# Patient Record
Sex: MALE | Race: BLACK OR AFRICAN AMERICAN | Employment: PART TIME | URBAN - METROPOLITAN AREA
[De-identification: names, ages, dates, MRNs, and addresses within clinical notes are randomized per-mention and may not be internally consistent; named-entity substitution may affect disease eponyms.]

---

## 2018-10-29 ENCOUNTER — HOSPITAL ENCOUNTER (EMERGENCY)
Age: 38
Discharge: HOME | End: 2018-10-29
Attending: EMERGENCY MEDICINE

## 2018-10-29 VITALS
DIASTOLIC BLOOD PRESSURE: 69 MMHG | SYSTOLIC BLOOD PRESSURE: 118 MMHG | HEART RATE: 84 BPM | RESPIRATION RATE: 15 BRPM | BODY MASS INDEX: 30.83 KG/M2 | OXYGEN SATURATION: 99 % | TEMPERATURE: 97.8 F | HEIGHT: 71 IN | WEIGHT: 220.2 LBS

## 2018-10-29 DIAGNOSIS — K08.89 PAIN, DENTAL: Primary | ICD-10-CM

## 2018-10-29 PROCEDURE — 99282 EMERGENCY DEPT VISIT SF MDM: CPT

## 2018-10-29 PROCEDURE — 6370000000 HC RX 637 (ALT 250 FOR IP): Performed by: EMERGENCY MEDICINE

## 2018-10-29 RX ORDER — CLINDAMYCIN HYDROCHLORIDE 300 MG/1
600 CAPSULE ORAL ONCE
Status: COMPLETED | OUTPATIENT
Start: 2018-10-29 | End: 2018-10-29

## 2018-10-29 RX ORDER — CLINDAMYCIN HYDROCHLORIDE 300 MG/1
300 CAPSULE ORAL 4 TIMES DAILY
Qty: 40 CAPSULE | Refills: 0 | Status: SHIPPED | OUTPATIENT
Start: 2018-10-29 | End: 2018-11-08

## 2018-10-29 RX ADMIN — CLINDAMYCIN HYDROCHLORIDE 600 MG: 300 CAPSULE ORAL at 09:37

## 2018-10-29 ASSESSMENT — PAIN DESCRIPTION - LOCATION: LOCATION: MOUTH

## 2018-10-29 ASSESSMENT — PAIN SCALES - GENERAL: PAINLEVEL_OUTOF10: 10

## 2018-10-29 ASSESSMENT — PAIN DESCRIPTION - DESCRIPTORS: DESCRIPTORS: ACHING;THROBBING

## 2018-10-29 ASSESSMENT — PAIN DESCRIPTION - PAIN TYPE: TYPE: ACUTE PAIN

## 2018-10-29 ASSESSMENT — PAIN DESCRIPTION - FREQUENCY: FREQUENCY: CONTINUOUS

## 2018-10-29 ASSESSMENT — PAIN DESCRIPTION - ORIENTATION: ORIENTATION: LEFT;LOWER

## 2018-10-29 NOTE — ED PROVIDER NOTES
and soft tissue swelling on the buccal surface of the gumline and maxillary area. No fluctuance. No elevation of floor of mouth. No lymphadenitis. Oropharynx clear. Airway patent. No stridor. No asymmetry. TM's clear. NECK: Supple  SKIN:Warm and dry. Physical Exam     RADIOLOGY  No orders to display       PROCEDURES  Procedures    ED COURSE/MDM  Patient seen and evaluated. Old records reviewed if pertinent. Labs and imaging reviewed and results discussed with patient. I considered dental cellulitis, dental abscess, irreversible pulpitis    Plan of care discussed with patient or family as appropriate. Patient or family in agreement with plan. If discharged, patient was given scripts for the following medications. Discharge Medication List as of 10/29/2018  9:36 AM      START taking these medications    Details   clindamycin (CLEOCIN) 300 MG capsule Take 1 capsule by mouth 4 times daily for 10 days, Disp-40 capsule, R-0Print             CLINICAL IMPRESSION  1. Pain, dental        Blood pressure 118/69, pulse 84, temperature 97.8 °F (36.6 °C), temperature source Oral, resp. rate 15, height 5' 11\" (1.803 m), weight 99.9 kg (220 lb 3.2 oz), SpO2 99 %. DISPOSITION  Atiya Cardenas was Discharged in stable condition.                    Alex Brown MD  10/29/18 8680       Alex Brown MD  10/29/18 7629

## 2021-04-01 ENCOUNTER — IMMUNIZATION (OUTPATIENT)
Dept: FAMILY MEDICINE CLINIC | Age: 41
End: 2021-04-01
Payer: COMMERCIAL

## 2021-04-01 PROCEDURE — 91300 COVID-19, PFIZER VACCINE 30MCG/0.3ML DOSE: CPT | Performed by: FAMILY MEDICINE

## 2021-04-01 PROCEDURE — 0001A COVID-19, PFIZER VACCINE 30MCG/0.3ML DOSE: CPT | Performed by: FAMILY MEDICINE

## 2021-04-22 ENCOUNTER — IMMUNIZATION (OUTPATIENT)
Dept: FAMILY MEDICINE CLINIC | Age: 41
End: 2021-04-22
Payer: COMMERCIAL

## 2021-04-22 PROCEDURE — 91300 COVID-19, PFIZER VACCINE 30MCG/0.3ML DOSE: CPT | Performed by: FAMILY MEDICINE

## 2021-04-22 PROCEDURE — 0002A COVID-19, PFIZER VACCINE 30MCG/0.3ML DOSE: CPT | Performed by: FAMILY MEDICINE

## 2022-07-13 ENCOUNTER — HOSPITAL ENCOUNTER (EMERGENCY)
Age: 42
Discharge: HOME | End: 2022-07-13
Attending: EMERGENCY MEDICINE
Payer: COMMERCIAL

## 2022-07-13 VITALS
SYSTOLIC BLOOD PRESSURE: 115 MMHG | RESPIRATION RATE: 14 BRPM | WEIGHT: 190.4 LBS | HEIGHT: 71 IN | OXYGEN SATURATION: 97 % | DIASTOLIC BLOOD PRESSURE: 71 MMHG | BODY MASS INDEX: 26.65 KG/M2 | TEMPERATURE: 97.6 F | HEART RATE: 75 BPM

## 2022-07-13 DIAGNOSIS — H18.891 CORNEAL IRRITATION OF RIGHT EYE: Primary | ICD-10-CM

## 2022-07-13 PROCEDURE — 6370000000 HC RX 637 (ALT 250 FOR IP): Performed by: EMERGENCY MEDICINE

## 2022-07-13 PROCEDURE — 99283 EMERGENCY DEPT VISIT LOW MDM: CPT

## 2022-07-13 RX ORDER — SULFACETAMIDE SODIUM 100 MG/ML
2 SOLUTION/ DROPS OPHTHALMIC 4 TIMES DAILY
Qty: 15 ML | Refills: 0 | Status: SHIPPED | OUTPATIENT
Start: 2022-07-13 | End: 2022-07-23

## 2022-07-13 RX ORDER — SULFACETAMIDE SODIUM 100 MG/ML
2 SOLUTION/ DROPS OPHTHALMIC ONCE
Status: DISCONTINUED | OUTPATIENT
Start: 2022-07-13 | End: 2022-07-13 | Stop reason: HOSPADM

## 2022-07-13 RX ORDER — ERYTHROMYCIN 5 MG/G
OINTMENT OPHTHALMIC ONCE
Status: COMPLETED | OUTPATIENT
Start: 2022-07-13 | End: 2022-07-13

## 2022-07-13 RX ADMIN — ERYTHROMYCIN: 5 OINTMENT OPHTHALMIC at 00:35

## 2022-07-13 RX ADMIN — FLUORESCEIN SODIUM 1 MG: 1 STRIP OPHTHALMIC at 00:20

## 2022-07-13 NOTE — ED PROVIDER NOTES
[facility]   eMERGENCY dEPARTMENT eNCOUnter      Pt Name: Mark Mckenna  MRN: [id number]  Lucíagfrafael 1980  Date of evaluation: 7/13/2022  Provider: Fara Mcdaniel MD  PCP: . None (Inactive)      CHIEF COMPLAINT       Chief Complaint   Patient presents with    Eye Pain     Pt thinks he has something in his right eye. +Pain and edema. HISTORY OFPRESENT ILLNESS   (Location/Symptom, Timing/Onset, Context/Setting, Quality, Duration, Modifying Factors,Severity)  Note limiting factors. Mark Mckenna is a 39 y.o. male cooking with cast iron and thinks he got something in his right eye he is complaining of some pain and swelling. No difficulty seeing out of the left eye    Nursing Notes were all reviewed and agreed with or any disagreements were addressed  in the HPI. REVIEW OF SYSTEMS    (2-9 systems for level 4, 10 or more for level 5)     Review of Systems    Positives and Pertinent negatives as per HPI. Except as noted above in the ROS, all other systems were reviewed andnegative. PASTMEDICAL HISTORY   History reviewed. No pertinent past medical history. SURGICAL HISTORY       Past Surgical History:   Procedure Laterality Date    SHOULDER ARTHROSCOPY  2/2011   [facility]ERY           CURRENT MEDICATIONS       Previous Medications    No medications on file       ALLERGIES     Pcn [penicillins]    FAMILY HISTORY     History reviewed. No pertinent family history. SOCIAL HISTORY       Social History     Socioeconomic History    Marital status: Single     Spouse name: None    Number of children: None    Years of education: None    Highest education level: None   Occupational History    None   Tobacco Use    Smoking status: Current Every Day Smoker     Packs/day: 0.50     Types: Cigarettes    Smokeless tobacco: Never Used   Substance and Sexual Activity    Alcohol use:  Yes    Drug use: No    Sexual activity: None   Other Topics Concern    None   Social History Narrative    None     Social Determinants of Health     Financial Resource Strain:     Difficulty of Paying Living Expenses: Not on file   Food Insecurity:     Worried About Running Out of Food in the Last Year: Not on file    [address] of Food in the Last Year: Not on file   Transportation Needs:     Lack of Transportation (Medical): Not on file    Lack of Transportation (Non-Medical): Not on file   Physical Activity:     Days of Exercise per Week: Not on file    Minutes of Exercise per Session: Not on file   Stress:     Feeling of Stress : Not on file   Social Connections:     Frequency of Communication with Friends and Family: Not on file    Frequency of Social Gatherings with Friends and Family: Not on file    Attends [employer]ism Services: Not on file    Active Member of [address] [place] [address] [employer] or Organizations: Not on file    Attends Club or Organization Meetings: Not on file    Marital Status: Not on file   Intimate Partner Violence:     Fear of Current or Ex-Partner: Not on file    Emotionally Abused: Not on file    Physically Abused: Not on file    Sexually Abused: Not on file   Housing Stability:     Unable to Pay for Housing in the Last Year: Not on file    Number of [place] in the Last Year: Not on file    Unstable Housing in the Last Year: Not on file       SCREENINGS    Cecilio Coma Scale  Eye Opening: Spontaneous  Best Verbal Response: Oriented  Best Motor Response: Obeys commands  Cecilio Coma Scale Score: 15 @FLOW([id number])@      PHYSICAL EXAM    (up to 7 for level 4, 8 or more for level 5)     ED Triage Vitals [07/13/22 0015]   BP Temp Temp Source Heart Rate Resp SpO2 Height Weight   115/71 97.6 °F (36.4 °C) Oral 75 14 97 % 5' 11\" (1.803 m) 190 lb 6.4 oz (86.4 kg)       Physical Exam      General Appearance:  Alert, cooperative, no distress, appears stated age. Head:  Normocephalic, without obviousabnormality, atraumatic.    Eyes:   The conjunctive a and the sclera are both injected extraocular motions are intact   ENT: Mucous membranes moist.   Neck: Supple, symmetrical, trachea midline, no adenopathy. No jugular venous distention. Extremities: No edema, cords or calf tenderness. Full range of motion. Pulses: 2+ and symmetric   Skin: Turgor is normal, no rashes or lesions. Neurologic: Alert and oriented X 3. No focal findings. Motor grossly normal.  Speech clear, no drift, CN III-XII grossly intact,        DIAGNOSTIC RESULTS   LABS:    Labs Reviewed - No data to display    All other labs were within normal range or not returned as of this dictation. EKG: All EKG's are interpreted by the Emergency Department Physician who eithersigns or Co-signs this chart in the absence of a cardiologist.      RADIOLOGY:   Non-plain film images such as CT, Ultrasound and MRI are read by the radiologist. Plain radiographic images are visualized by myself. *    Interpretation per the Radiologist below, if available at the time of this note:    No orders to display         PROCEDURES   Unless otherwise noted below, none     Procedures    *Fluorescein exam was performed that shows no corneal abrasions I then flipped the lid and was unable to identify any obvious foreign body    CRITICAL CARE TIME   N/A      EMERGENCY DEPARTMENT COURSE and DIFFERENTIALDIAGNOSIS/MDM:   Vitals:    Vitals:    07/13/22 0015   BP: 115/71   Pulse: 75   Resp: 14   Temp: 97.6 °F (36.4 °C)   TempSrc: Oral   SpO2: 97%   Weight: 190 lb 6.4 oz (86.4 kg)   Height: 5' 11\" (1.803 m)       Patient was given thefollowing medications:  Medications   fluorescein ophthalmic strip 1 mg (has no administration in time range)   sulfacetamide (BLEPH-10) 10 % ophthalmic solution 2 drop (has no administration in time range)           The patient tolerated their visit well. The patient and / or the familywere informed of the results of any tests, a time was given to answer questions.     FINAL

## 2022-08-08 ENCOUNTER — OFFICE VISIT (OUTPATIENT)
Dept: INTERNAL MEDICINE CLINIC | Age: 42
End: 2022-08-08
Payer: COMMERCIAL

## 2022-08-08 VITALS
BODY MASS INDEX: 26.78 KG/M2 | OXYGEN SATURATION: 96 % | DIASTOLIC BLOOD PRESSURE: 70 MMHG | WEIGHT: 192 LBS | HEART RATE: 87 BPM | SYSTOLIC BLOOD PRESSURE: 110 MMHG

## 2022-08-08 DIAGNOSIS — G43.009 MIGRAINE WITHOUT AURA AND WITHOUT STATUS MIGRAINOSUS, NOT INTRACTABLE: ICD-10-CM

## 2022-08-08 DIAGNOSIS — R63.4 WEIGHT LOSS: ICD-10-CM

## 2022-08-08 DIAGNOSIS — F17.210 SMOKES CIGARETTES: ICD-10-CM

## 2022-08-08 DIAGNOSIS — Z13.9 SCREENING FOR CONDITION: ICD-10-CM

## 2022-08-08 DIAGNOSIS — F12.90 MARIJUANA USE: ICD-10-CM

## 2022-08-08 DIAGNOSIS — H53.9 VISION DISTURBANCE: Primary | ICD-10-CM

## 2022-08-08 DIAGNOSIS — Z83.3 FAMILY HISTORY OF DIABETES MELLITUS (DM): ICD-10-CM

## 2022-08-08 DIAGNOSIS — R13.19 OTHER DYSPHAGIA: ICD-10-CM

## 2022-08-08 LAB
BILIRUBIN, POC: NORMAL
BLOOD URINE, POC: NEGATIVE
CHP ED QC CHECK: NORMAL
CLARITY, POC: CLEAR
COLOR, POC: YELLOW
GLUCOSE BLD-MCNC: 90 MG/DL
GLUCOSE URINE, POC: NEGATIVE
HBA1C MFR BLD: 5.4 %
KETONES, POC: NEGATIVE
LEUKOCYTE EST, POC: NEGATIVE
NITRITE, POC: NEGATIVE
PH, POC: 5.5
PROTEIN, POC: NEGATIVE
SPECIFIC GRAVITY, POC: 1.03
UROBILINOGEN, POC: 0.2

## 2022-08-08 PROCEDURE — 81002 URINALYSIS NONAUTO W/O SCOPE: CPT | Performed by: INTERNAL MEDICINE

## 2022-08-08 PROCEDURE — G8419 CALC BMI OUT NRM PARAM NOF/U: HCPCS | Performed by: INTERNAL MEDICINE

## 2022-08-08 PROCEDURE — 82962 GLUCOSE BLOOD TEST: CPT | Performed by: INTERNAL MEDICINE

## 2022-08-08 PROCEDURE — 83036 HEMOGLOBIN GLYCOSYLATED A1C: CPT | Performed by: INTERNAL MEDICINE

## 2022-08-08 PROCEDURE — 99204 OFFICE O/P NEW MOD 45 MIN: CPT | Performed by: INTERNAL MEDICINE

## 2022-08-08 PROCEDURE — 4004F PT TOBACCO SCREEN RCVD TLK: CPT | Performed by: INTERNAL MEDICINE

## 2022-08-08 PROCEDURE — 99406 BEHAV CHNG SMOKING 3-10 MIN: CPT | Performed by: INTERNAL MEDICINE

## 2022-08-08 PROCEDURE — G8427 DOCREV CUR MEDS BY ELIG CLIN: HCPCS | Performed by: INTERNAL MEDICINE

## 2022-08-08 RX ORDER — FAMOTIDINE 20 MG/1
20 TABLET, FILM COATED ORAL 2 TIMES DAILY
Qty: 60 TABLET | Refills: 3 | Status: SHIPPED | OUTPATIENT
Start: 2022-08-08 | End: 2022-09-19 | Stop reason: SDUPTHER

## 2022-08-08 SDOH — ECONOMIC STABILITY: FOOD INSECURITY: WITHIN THE PAST 12 MONTHS, YOU WORRIED THAT YOUR FOOD WOULD RUN OUT BEFORE YOU GOT MONEY TO BUY MORE.: NEVER TRUE

## 2022-08-08 SDOH — ECONOMIC STABILITY: FOOD INSECURITY: WITHIN THE PAST 12 MONTHS, THE FOOD YOU BOUGHT JUST DIDN'T LAST AND YOU DIDN'T HAVE MONEY TO GET MORE.: NEVER TRUE

## 2022-08-08 ASSESSMENT — SOCIAL DETERMINANTS OF HEALTH (SDOH): HOW HARD IS IT FOR YOU TO PAY FOR THE VERY BASICS LIKE FOOD, HOUSING, MEDICAL CARE, AND HEATING?: NOT HARD AT ALL

## 2022-08-08 ASSESSMENT — PATIENT HEALTH QUESTIONNAIRE - PHQ9
2. FEELING DOWN, DEPRESSED OR HOPELESS: 0
SUM OF ALL RESPONSES TO PHQ QUESTIONS 1-9: 0
1. LITTLE INTEREST OR PLEASURE IN DOING THINGS: 0
SUM OF ALL RESPONSES TO PHQ QUESTIONS 1-9: 0
SUM OF ALL RESPONSES TO PHQ9 QUESTIONS 1 & 2: 0

## 2022-08-08 NOTE — PATIENT INSTRUCTIONS
TAKE MED AS ADVISED    DIET/ EXERCISE. FOLLOW UP WITHIN 6 WEEKS / AS NEEDED    FOLLOW UP FOR FASTING LABS, CAT SCAN, [address]  Area Laboratory Locations - No appointment necessary. @ indicates the location is open Saturdays in addition to Monday through Friday. Call your preferred location for test preparation, business hours and other information you need. [employer] accepts [employer]. [facility]    @ [place] [facility] Svcs. 3 [facility] Rachana Carrear. Simon, [address]   Ph: [phone] [facility] Lab Svcs. [address]   Ph: [phone]  @ [place] [facility] Lab Svcs. [address] [facility]   [facility]   Ph: [phone]    [facility] Svcs. 409 [facility] 70   Ph: [phone] @ [place] [facility] Svcs. [address]  Ph: [phone] @ [facility] Lab Svcs. 3215 [facility]. Simon Edgard Wil[address]   Ph: [phone]     Lucien [facility] Svcs. [place] [address]  Ph: [phone]    [place]   @ [facility] Svcs. [address] [place], [place] [address]   Ph: [phone] [place] [facility]. Office Bldg. 719 [facility], [address] [place] [address]  Ph: [address] [facility] 95, [address]   [place] [address] Lab Svcs. 54 [facility]   Ph: [phone] [facility]. Lab Svcs.   211 [facility], [address]   Ph: [phone]

## 2022-08-08 NOTE — PROGRESS NOTES
SUBJECTIVE:  Eloy Beach is a 39 y.o. male HERE FOR   Chief Complaint   Patient presents with    New Patient      PT HERE TO [address] [place] [address] PCP: NONE      C/O VISUAL CHANGES - ? EL. PAST FEW MONTHS. STATES RECENT EYE EXAM AT [facility]. USING PRESCRIPTION LENSES. NO IMPROVEMENT  C/O DYSPHAGIA - BOTH SOLIDS AND LIQUIDS. PAST 1 MONTH. ? ODYNOPHAGIA. + OCC HEARTBURN  C/O WT LOSS - PAST FEW MONTHS  H/O MIGRAINE HA - NO HEADACHES AT THIS TIME  + SMOKER - CESSATION ADVISED  + MARIJUANA USE - CESSATION ADVISED  + FH DM - DIET / EXERCISE REVIEWED      DENIES CP, No SOB, No PALPITATIONS, No COUGH, NO F/C  No ABD PAIN, No N/V, No DIARRHEA, No CONSTIPATION, No MELENA, No HEMATOCHEZIA. No DYSURIA, No FREQ, No URGENCY, No HEMATURIA    PMH: REVIEWED AND UPDATED TODAY    PSH: REVIEWED AND UPDATED TODAY    SOCIAL HX: REVIEWED AND UPDATED TODAY    FAMILY HX: REVIEWED AND UPDATED TODAY    ALLERGY:  Pcn [penicillins]    MEDS: REVIEWED  Prior to Visit Medications    Not on File     IMMUNIZATION: DOES NOT TAKE INFLUENZA, NO PNEUMONIA VACCINE, ? TETANUS, COVID 2021    ROS: COMPREHENSIVE ROS AS IN HX, REST -VE  History obtained from chart review and the patient       OBJECTIVE:   NURSING NOTE AND VITALS REVIEWED  /80 (Site: Left Upper Arm, Position: Sitting, Cuff Size: Large Adult)   Pulse 87   Wt 192 lb (87.1 kg)   SpO2 96%   BMI 26.78 kg/m²     NO ACUTE DISTRESS  + NICOTINE BREATH    REPEAT BP: 110/70 (LT), NO ORTHOSTASIS     Body mass index is 26.78 kg/m². HEENT: MILD PALLOR, ANICTERIC, PERRLA, EOMI, NO CONJUNCTIVAL ERYTHEMA,                 NO SINUS TENDERNESS  NECK:  SUPPLE, TRACHEA MIDLINE, NT, NO JVD, NO CB, NO LA, NO TM, NO STIFFNESS  CHEST: RESPY EFFORT NL, GOOD AE, NO W/R/C  HEART: S1S2+ REG, NO M/G/R  ABD: SOFT, ? NT, NO HSM, BS+  EXT: NO EDEMA, NT, PULSES +.  SLOANE'S -VE  NEURO: ALERT AND ORIENTED X 3, NO MENINGEAL SIGNS, NO TREMORS, NL GAIT, NO FOCAL DEFICITS  PSYCH: FAIRLY GOOD AFFECT  BACK: NT, NO ROM, NO CVA TENDERNESS     PREVIOUS LABS REVIEWED AND D/W PT    ACCUCHECK: 90     POC HBA1C: 5.4    UA: SMALL BILI , NEGATIVE FR UTI    ASSESSMENT / PLAN:     Diagnosis Orders   1. Vision disturbance  COUNSELLED. NEGATIVE FOR DM  F/U LABS TO EVAL  READDRESS OPHTHAL REFERRAL  MONITOR. MAKE CHANGES AS NEEDED. 2. Other dysphagia  COUNSELLED. BARIUM SWALLOW TO EVAL  START ON PEPCID 20 MG BID FOR PROBABLE GERD  ADVISED D/C SMOKING  ANTIREFLUX PRECAUTIONS ADVISED. MONITOR. READDRESS GI EVAL  MAKE CHANGES AS NEEDED. 3. Weight loss  COUNSELLED. DIET REVIEWED. ADVISED AT LEAST 3 MEALS DAILY . MONITOR WT.   LABS, CT TO EVAL  READDRESS COLONOSCOPY  PT VERBALIZED UNDERSTANDING  MAKE CHANGES AS NEEDED. 4. Migraine without aura and without status migrainosus, not intractable  COUNSELLED. SYMPTOMATIC RX. MONITOR  MAKE CHANGES AS NEEDED. 5. Smokes cigarettes  Smoking cessation was encouraged. Cessation techniques reviewed today. COUNSELLED. CESSATION ADVISED   OH TOBACCO USE CESSATION INTERMEDIATE 3-10 MINUTES (5 MINS)  COMPLICATIONS OF TOBACCO USE INCLUDING COPD, CANCER, CAD D/W PT  PT VERBALIZED UNDERSTANDING  READDRES PATCHES  CT AS ABOVE  MAKE CHANGES AS NEEDED. 6. Marijuana use  COUNSELLED. CESSATION ADVISED. MONITOR  MAKE CHANGES AS NEEDED. 7. Family history of diabetes mellitus (DM)  COUNSELLED. ADVISED ON DIET / EXERCISE  ADVISED RISK FACTOR MODIFICATION. MONITOR  MAKE CHANGES AS NEEDED. 8. Screening for condition  COUNSELLED. LABS TO EVAL - MICHAEL LU PT  MAKE CHANGES AS NEEDED.                          MEDICATION SIDE EFFECTS D/W PATIENT    RETURN TO CLINIC WITHIN 6 WEEKS / PRN    FOLLOW UP FOR FASTING LABS, CAT SCAN, BARIUM SWALLOW

## 2022-08-12 ENCOUNTER — HOSPITAL ENCOUNTER (OUTPATIENT)
Dept: CT IMAGING | Age: 42
Discharge: HOME | End: 2022-08-12
Payer: COMMERCIAL

## 2022-08-12 ENCOUNTER — HOSPITAL ENCOUNTER (OUTPATIENT)
Dept: GENERAL RADIOLOGY | Age: 42
Discharge: HOME | End: 2022-08-12
Payer: COMMERCIAL

## 2022-08-12 DIAGNOSIS — F17.210 SMOKES CIGARETTES: ICD-10-CM

## 2022-08-12 DIAGNOSIS — R13.19 OTHER DYSPHAGIA: ICD-10-CM

## 2022-08-12 DIAGNOSIS — R63.4 WEIGHT LOSS: ICD-10-CM

## 2022-08-12 PROCEDURE — 74220 X-RAY XM ESOPHAGUS 1CNTRST: CPT

## 2022-08-12 PROCEDURE — 71250 CT THORAX DX C-: CPT

## 2022-08-19 ENCOUNTER — TELEPHONE (OUTPATIENT)
Dept: INTERNAL MEDICINE CLINIC | Age: 42
End: 2022-08-19

## 2022-08-19 NOTE — TELEPHONE ENCOUNTER
[facility] authorization department needs a peer to peer for this patient please call [phone],  tracking number is [id number]  Please advise

## 2022-08-24 NOTE — TELEPHONE ENCOUNTER
CALLED AND D/W PT  CT DENIED AT THIS TIME    STATES NEEDED A CURRENT CXR    ADVISED INSURANCE THAT CT HAS ALREADY BEEN COMPLETED

## 2022-09-19 ENCOUNTER — OFFICE VISIT (OUTPATIENT)
Dept: INTERNAL MEDICINE CLINIC | Age: 42
End: 2022-09-19
Payer: COMMERCIAL

## 2022-09-19 VITALS
SYSTOLIC BLOOD PRESSURE: 102 MMHG | OXYGEN SATURATION: 98 % | HEART RATE: 76 BPM | DIASTOLIC BLOOD PRESSURE: 70 MMHG | WEIGHT: 189 LBS | BODY MASS INDEX: 26.36 KG/M2

## 2022-09-19 DIAGNOSIS — J43.8 OTHER EMPHYSEMA (HCC): Primary | ICD-10-CM

## 2022-09-19 DIAGNOSIS — R63.4 WEIGHT LOSS: ICD-10-CM

## 2022-09-19 DIAGNOSIS — R13.19 OTHER DYSPHAGIA: ICD-10-CM

## 2022-09-19 DIAGNOSIS — R91.8 PULMONARY NODULES: ICD-10-CM

## 2022-09-19 DIAGNOSIS — F17.210 SMOKES CIGARETTES: ICD-10-CM

## 2022-09-19 DIAGNOSIS — K22.4 ESOPHAGEAL DYSMOTILITY: ICD-10-CM

## 2022-09-19 DIAGNOSIS — G43.009 MIGRAINE WITHOUT AURA AND WITHOUT STATUS MIGRAINOSUS, NOT INTRACTABLE: ICD-10-CM

## 2022-09-19 DIAGNOSIS — F12.90 MARIJUANA USE: ICD-10-CM

## 2022-09-19 PROCEDURE — 4004F PT TOBACCO SCREEN RCVD TLK: CPT | Performed by: INTERNAL MEDICINE

## 2022-09-19 PROCEDURE — 3023F SPIROM DOC REV: CPT | Performed by: INTERNAL MEDICINE

## 2022-09-19 PROCEDURE — G8427 DOCREV CUR MEDS BY ELIG CLIN: HCPCS | Performed by: INTERNAL MEDICINE

## 2022-09-19 PROCEDURE — 99214 OFFICE O/P EST MOD 30 MIN: CPT | Performed by: INTERNAL MEDICINE

## 2022-09-19 PROCEDURE — G8419 CALC BMI OUT NRM PARAM NOF/U: HCPCS | Performed by: INTERNAL MEDICINE

## 2022-09-19 RX ORDER — FAMOTIDINE 20 MG/1
20 TABLET, FILM COATED ORAL 2 TIMES DAILY
Qty: 60 TABLET | Refills: 3 | Status: SHIPPED | OUTPATIENT
Start: 2022-09-19

## 2022-09-19 RX ORDER — TIOTROPIUM BROMIDE 18 UG/1
18 CAPSULE ORAL; RESPIRATORY (INHALATION) DAILY
Qty: 30 CAPSULE | Refills: 3 | Status: SHIPPED | OUTPATIENT
Start: 2022-09-19

## 2022-09-19 NOTE — PROGRESS NOTES
SUBJECTIVE:  Eloy Beach is a 43 y.o. male HERE FOR   Chief Complaint   Patient presents with    Follow-up      PT HERE TO [address] [place] [address] PCP: NONE     EMPHYSEMA - NOTED ON CT. D/W PT. ? NO SOB, NO WHEEZING AT THIS TIME  PULM NODULES - NOTED ON CT - D/W PT   DYSPHAGIA - IMPROVED. MED HELPING  WT LOSS - UNCHANGED. DIET REVIEWED. MIGRAINE HA - LAST EPISODE > 1 YR  + SMOKER - CESSATION ADVISED  + MARIJUANA USE - CESSATION ADVISED   VISUAL CHANGES - RESOLVED. USING PRESCRIPTION LENSES. S/P OPHTHAL EVAL     DENIES CP, No SOB, No PALPITATIONS, No COUGH, NO F/C  No ABD PAIN, No N/V, No DIARRHEA, No CONSTIPATION, No MELENA, No HEMATOCHEZIA. No DYSURIA, No FREQ, No URGENCY, No HEMATURIA    PMH: REVIEWED AND UPDATED TODAY    PSH: REVIEWED AND UPDATED TODAY    SOCIAL HX: REVIEWED AND UPDATED TODAY    FAMILY HX: REVIEWED AND UPDATED TODAY    ALLERGY:  Pcn [penicillins]    MEDS: REVIEWED  Prior to Visit Medications    Medication Sig Taking? Authorizing Provider   famotidine (PEPCID) 20 MG tablet Take 1 tablet by mouth in the morning and 1 tablet before bedtime. Yes Jone Hill MD       ROS: COMPREHENSIVE ROS AS IN HX, REST -VE  History obtained from chart review and the patient       OBJECTIVE:   NURSING NOTE AND VITALS REVIEWED  /76 (Site: Left Upper Arm, Position: Sitting, Cuff Size: Large Adult)   Pulse 76   Wt 189 lb (85.7 kg)   SpO2 98%   BMI 26.36 kg/m²     NO ACUTE DISTRESS  + NICOTINE BREATH     REPEAT BP: 102/70 (LT), NO ORTHOSTASIS     Body mass index is 26.36 kg/m². HEENT: NO PALLOR, ANICTERIC, PERRLA, EOMI, NO CONJUNCTIVAL ERYTHEMA,                 NO SINUS TENDERNESS  NECK:  SUPPLE, TRACHEA MIDLINE, NT, NO JVD, NO CB, NO LA, NO TM, NO STIFFNESS  CHEST: RESPY EFFORT NL, GOOD AE, NO W/R/C  HEART: S1S2+ REG, NO M/G/R  ABD: SOFT, NT, NO HSM, BS+  EXT: NO EDEMA, NT, PULSES +.  SLOANE'S -VE  NEURO: ALERT AND ORIENTED X 3, NO MENINGEAL SIGNS, NO TREMORS, NL GAIT, NO FOCAL DEFICITS  PSYCH: FAIRLY GOOD AFFECT  BACK: NT, NO ROM, NO CVA TENDERNESS     PREVIOUS LABS / CT / BARIUM SWALLOW REVIEWED AND D/W PT / LAST LABS PENDING    Impression       1. No acute cardiopulmonary abnormality. 2.  Mild emphysema. 3.  Minimal coronary artery calcification. 4.  Small number of sub-6 mm pulmonary nodules. Current guidelines recommend additional follow-up CT in 12 months. Impression   1. Moderate esophageal dysmotility. 2. No mass or stricture identified. No ulceration. ASSESSMENT / PLAN:     Diagnosis Orders   1. Other emphysema ([address]  COUNSELLED. START ON SPIRIVA DAILY  NEED FOR SMOKING CESSATION D/W PT.  MAKE CHANGES AS NEEDED. 2. Pulmonary nodules  COUNSELLED. D/W PT  READDRESS REPEAT CT AS RECOMMENDED  MAKE CHANGES AS NEEDED. 3. Other dysphagia  COUNSELLED. IMPROVING. CONTINUE MED  ANTIREFLUX PRECAUTIONS ADVISED. MONITOR. MAKE CHANGES AS NEEDED. 4. Esophageal dysmotility  COUNSELLED. SEE ABOVE. READDRESS GI EVAL  MAKE CHANGES AS NEEDED. 5. Weight loss  COUNSELLED. DIET REVIEWED. ADVISED AT LEAST 3 MEALS DAILY WITH DIETARY SUPPLEMENT. MONITOR WT.   ADVISED SMOKING CESSATION  F/U LABS  MAKE CHANGES AS NEEDED. 6. Migraine without aura and without status migrainosus, not intractable  COUNSELLED. STABLE. MONITOR  MAKE CHANGES AS NEEDED. 7. Smokes cigarettes  Smoking cessation was encouraged. Cessation techniques reviewed today. COUNSELLED. CESSATION ADVISED   COMPLICATIONS OF TOBACCO USE INCLUDING COPD, CANCER, CAD D/W PT  PT VERBALIZED UNDERSTANDING  MAKE CHANGES AS NEEDED. 8. Marijuana use  COUNSELLED. CESSATION READDRESSED  MAKE CHANGES AS NEEDED.                PT DECLINED INFLUENZA VACCINE            MEDICATION SIDE EFFECTS D/W PATIENT    RETURN TO CLINIC WITHIN 2 MONTHS / PRN    FOLLOW UP FOR FASTING LABS

## 2022-09-19 NOTE — PATIENT INSTRUCTIONS
TAKE MED AS ADVISED    DIET/ EXERCISE. FOLLOW UP WITHIN 2 MONTHS / AS NEEDED    FOLLOW UP FOR FASTING [facility] Laboratory Locations - No appointment necessary. @ indicates the location is open Saturdays in addition to Monday through Friday. Call your preferred location for test preparation, business hours and other information you need. [employer] accepts [employer]. [facility]    @ [place] Lab Svcs. 3 [facility]. [place], [address]   Ph: [phone] [facility] Lab Svcs. [address]   Ph: [phone]  @ [facility] Lab Svcs. 3155 [facility]   [facility]   Ph: [phone]    [facility] Lab Svcs. [address] Serv[address]Artur salamancachidi [address]   Ph: [phone] @ [place] Lab Svcs. [address]  Ph: [phone] @ [facility] Lab Svcs. [address] [facility]. Edgard Montes 429   Ph: [phone]     Nicolette Coe Svcs. [address]  Ph: [phone]    [place]   @ [place] [facility] Svcs. [address] [place], [place] [address]   Ph: [phone] [place] [facility]. Office Bldg. 3280 Nasim Montes, 800 [facility]  Ph: [address] [facility] 95, [address]   [place] [address] Lab Svcs. 54 [facility]   Ph: [phone] [facility]. Lab Svcs.   211 [facility], [facility]   Ph: [phone]

## 2023-02-14 DIAGNOSIS — R13.19 OTHER DYSPHAGIA: ICD-10-CM

## 2023-02-14 RX ORDER — FAMOTIDINE 20 MG/1
20 TABLET, FILM COATED ORAL 2 TIMES DAILY
Qty: 60 TABLET | Refills: 0 | Status: SHIPPED | OUTPATIENT
Start: 2023-02-14

## 2023-02-14 NOTE — TELEPHONE ENCOUNTER
----- Message from Marya Cornell sent at 2/14/2023  8:33 AM EST -----  Subject: Refill Request    QUESTIONS  Name of Medication? famotidine (PEPCID) 20 MG tablet  Patient-reported dosage and instructions? 2 times daily  How many days do you have left? 0  Preferred Pharmacy? [facility] [id number]  Pharmacy phone number (if available)? [phone]  Additional Information for Provider? pt has future appt on 3/6  ---------------------------------------------------------------------------  --------------  CALL BACK INFO  What is the best way for the office to contact you? OK to leave message on   voicemail  Preferred Call Back Phone Number? [phone]  ---------------------------------------------------------------------------  --------------  SCRIPT ANSWERS  Relationship to Patient?  Self

## 2023-03-06 ENCOUNTER — OFFICE VISIT (OUTPATIENT)
Dept: INTERNAL MEDICINE CLINIC | Age: 43
End: 2023-03-06

## 2023-03-06 VITALS
OXYGEN SATURATION: 99 % | HEART RATE: 81 BPM | WEIGHT: 185 LBS | SYSTOLIC BLOOD PRESSURE: 110 MMHG | DIASTOLIC BLOOD PRESSURE: 70 MMHG | BODY MASS INDEX: 25.8 KG/M2

## 2023-03-06 DIAGNOSIS — G43.009 MIGRAINE WITHOUT AURA AND WITHOUT STATUS MIGRAINOSUS, NOT INTRACTABLE: ICD-10-CM

## 2023-03-06 DIAGNOSIS — D70.9 NEUTROPENIA, UNSPECIFIED TYPE (HCC): ICD-10-CM

## 2023-03-06 DIAGNOSIS — K22.4 ESOPHAGEAL DYSMOTILITY: ICD-10-CM

## 2023-03-06 DIAGNOSIS — R91.8 PULMONARY NODULES: ICD-10-CM

## 2023-03-06 DIAGNOSIS — J43.8 OTHER EMPHYSEMA (HCC): ICD-10-CM

## 2023-03-06 DIAGNOSIS — R63.4 WEIGHT LOSS: Primary | ICD-10-CM

## 2023-03-06 DIAGNOSIS — F12.90 MARIJUANA USE: ICD-10-CM

## 2023-03-06 DIAGNOSIS — F17.210 SMOKES CIGARETTES: ICD-10-CM

## 2023-03-06 DIAGNOSIS — E55.9 VITAMIN D DEFICIENCY: ICD-10-CM

## 2023-03-06 RX ORDER — TIOTROPIUM BROMIDE 18 UG/1
18 CAPSULE ORAL; RESPIRATORY (INHALATION) DAILY
Qty: 30 CAPSULE | Refills: 3 | Status: SHIPPED | OUTPATIENT
Start: 2023-03-06

## 2023-03-06 RX ORDER — FAMOTIDINE 20 MG/1
20 TABLET, FILM COATED ORAL 2 TIMES DAILY
Qty: 60 TABLET | Refills: 3 | Status: SHIPPED | OUTPATIENT
Start: 2023-03-06

## 2023-03-06 RX ORDER — M-VIT,TX,IRON,MINS/CALC/FOLIC 27MG-0.4MG
1 TABLET ORAL DAILY
Qty: 30 TABLET | Refills: 3 | Status: SHIPPED | OUTPATIENT
Start: 2023-03-06 | End: 2024-03-05

## 2023-03-06 ASSESSMENT — PATIENT HEALTH QUESTIONNAIRE - PHQ9
2. FEELING DOWN, DEPRESSED OR HOPELESS: 0
SUM OF ALL RESPONSES TO PHQ QUESTIONS 1-9: 0
SUM OF ALL RESPONSES TO PHQ9 QUESTIONS 1 & 2: 0
SUM OF ALL RESPONSES TO PHQ QUESTIONS 1-9: 0
1. LITTLE INTEREST OR PLEASURE IN DOING THINGS: 0
SUM OF ALL RESPONSES TO PHQ QUESTIONS 1-9: 0
SUM OF ALL RESPONSES TO PHQ QUESTIONS 1-9: 0

## 2023-03-06 NOTE — PATIENT INSTRUCTIONS
TAKE MED AS ADVISED    DIET/ EXERCISE. FOLLOW UP WITHIN 3 MONTHS / AS NEEDED    FOLLOW UP  [facility] Laboratory Locations - No appointment necessary. @ indicates the location is open Saturdays in addition to Monday through Friday. Call your preferred location for test preparation, business hours and other information you need. [employer] accepts [employer]. [facility]     @ [facility] [address] [facility]. [place], [address] [place] [address] Ave    Ph: [address]   650 [facility], [address]    Ph: [phone]   @ [address] [place] [address].,    [facility]    Ph: Erica [address]    Ph: [phone]  @ [address] [place] [address]   Ph: [phone]  @ [address] [place] [address]. SimonEdgard [facility] [address]    Ph: [address] [facility] 19   Ph: [phone]    [place]    @ [facility]. [place], [place] [address]    Ph: [phone]  [facility]   3280 [facility], [address]   Ph: Ysgenaro [address] Baylee Ramirez. [address] StreetmitchFormerly [facility] 30: 311 [facility] Dionicio Morataya    Ph: [phone]   [address] 2026 [facility]. Dionicio Asif   Ph: 501 [facility] Med.  [address] [place], [place] [address]    Ph: [phone]

## 2023-03-06 NOTE — PROGRESS NOTES
SUBJECTIVE:  Mark Mckenna is a 43 y.o. male HERE FOR   Chief Complaint   Patient presents with    Follow-up        PT HERE FOR EVAL     WT LOSS - DIET REVIEWED. WT NOTED  EMPHYSEMA - NO WHEEZING, NO SOB, NO INCREASED INHALER USE . PREVIOUS CT D/W PT  PULM NODULES - NOTED ON PREVIOUS CT - D/W PT   VIT D DEF - LAB D/W PT  ESOPHAGEAL DYSMOTILITY - DYSPHAGIA - IMPROVED. MED HELPING  MIGRAINE HA - LAST EPISODE > 1 YR  + SMOKER - CESSATION READDRESSED  + MARIJUANA USE - CESSATION ADVISED  LEUCOPENIA - LAB D/W PT     DENIES CP, No SOB, No PALPITATIONS, No COUGH, NO F/C  No ABD PAIN, No N/V, No DIARRHEA, No CONSTIPATION, No MELENA, No HEMATOCHEZIA. No DYSURIA, No FREQ, No URGENCY, No HEMATURIA    PMH: REVIEWED AND UPDATED TODAY    PSH: REVIEWED AND UPDATED TODAY    SOCIAL HX: REVIEWED AND UPDATED TODAY    FAMILY HX: REVIEWED AND UPDATED TODAY    ALLERGY:  Pcn [penicillins]    MEDS: REVIEWED  Prior to Visit Medications    Medication Sig Taking? Authorizing Provider   famotidine (PEPCID) 20 MG tablet Take 1 tablet by mouth 2 times daily Yes Meredith Yoo MD   tiotropium (Maryse Punter) 18 MCG inhalation capsule Inhale 1 capsule into the lungs daily Yes Meredith Yoo MD       ROS: COMPREHENSIVE ROS AS IN HX, REST -VE  History obtained from chart review and the patient       OBJECTIVE:   NURSING NOTE AND VITALS REVIEWED  /80   Pulse 81   Wt 185 lb (83.9 kg)   SpO2 99%   BMI 25.80 kg/m²     NO ACUTE DISTRESS  + NICOTINE BREATH    REPEAT BP: 110/70 (LT), NO ORTHOSTASIS     Body mass index is 25.8 kg/m². HEENT: NO PALLOR, ANICTERIC, PERRLA, EOMI, NO CONJUNCTIVAL ERYTHEMA,                 NO SINUS TENDERNESS  NECK:  SUPPLE, TRACHEA MIDLINE, NT, NO JVD, NO CB, NO LA, NO TM, NO STIFFNESS  CHEST: RESPY EFFORT NL, GOOD AE, NO W/R/C  HEART: S1S2+ REG, NO M/G/R  ABD: SOFT, NT, NO HSM, BS+  EXT: NO EDEMA, NT, PULSES +.  SLOANE'S -VE  NEURO: ALERT AND ORIENTED X 3, NO MENINGEAL SIGNS, NO TREMORS, NL GAIT, NO FOCAL DEFICITS  PSYCH: FAIRLY GOOD AFFECT  BACK: NT, NO ROM, NO CVA TENDERNESS     PREVIOUS LABS REVIEWED AND D/W PT    ASSESSMENT / PLAN:     Diagnosis Orders   1. Weight loss  COUNSELLED. DIET REVIEWED. ADVISED AT LEAST 3 MEALS DAILY WITH DIETARY SUPPLEMENT. MONITOR WT.   START ON Multiple Vitamins-Minerals (THERAPEUTIC MULTIVITAMIN-MINERALS)   ADVISED ON SMOKING CESSATION  PT DEFERRED GI EVAL  MONITOR. MAKE CHANGES AS NEEDED. 2. Other emphysema ([facility]  COUNSELLED. CONTINUE SPIRIVA   ADVISED ON SMOKING CESSATION. MONITOR. MAKE CHANGES AS NEEDED. 3. Pulmonary nodules  COUNSELLED. READDRESS FOLLOW UP CT - 8/23  MAKE CHANGES AS NEEDED. 4. Vitamin D deficiency  COUNSELLED. ADVISED START ON VIT D 1000 U DAILY. MONITOR AND MAKE CHANGES AS NEEDED. 5. Esophageal dysmotility  COUNSELLED. DYSPHAGIA IMPROVED  MONITOR ON MED  ANTIREFLUX PRECAUTIONS ADVISED. MONITOR. MAKE CHANGES AS NEEDED. 6. Migraine without aura and without status migrainosus, not intractable  COUNSELLED. STABLE. MONITOR  MAKE CHANGES AS NEEDED. 7. Smokes cigarettes  Smoking cessation was encouraged. Cessation techniques reviewed today. COUNSELLED. CESSATION ADVISED   SC TOBACCO USE CESSATION INTERMEDIATE 0-51 MINUTES  COMPLICATIONS OF TOBACCO USE INCLUDING COPD, CANCER, CAD D/W PT  PT VERBALIZED UNDERSTANDING  PT DECLINED PATCHES  MAKE CHANGES AS NEEDED. 8. Marijuana use  COUNSELLED. CESSATION ADVISED. READDRESS  MAKE CHANGES AS NEEDED. 9. Neutropenia, unspecified type ([facility]  COUNSELLED. F/U LAB TO [facility]EE[facility]. MONITOR  MAKE CHANGES AS NEEDED.                          MEDICATION SIDE EFFECTS D/W PATIENT      RETURN TO CLINIC WITHIN 3 MONTHS / PRN    FOLLOW UP FOR LABS

## 2023-06-06 ENCOUNTER — HOSPITAL ENCOUNTER (OUTPATIENT)
Age: 43
Discharge: HOME | End: 2023-06-06
Payer: COMMERCIAL

## 2023-06-06 ENCOUNTER — OFFICE VISIT (OUTPATIENT)
Dept: INTERNAL MEDICINE CLINIC | Age: 43
End: 2023-06-06
Payer: COMMERCIAL

## 2023-06-06 ENCOUNTER — HOSPITAL ENCOUNTER (OUTPATIENT)
Dept: GENERAL RADIOLOGY | Age: 43
Discharge: HOME | End: 2023-06-06
Payer: COMMERCIAL

## 2023-06-06 VITALS
BODY MASS INDEX: 23.91 KG/M2 | HEIGHT: 71 IN | WEIGHT: 170.8 LBS | OXYGEN SATURATION: 98 % | DIASTOLIC BLOOD PRESSURE: 78 MMHG | TEMPERATURE: 97.9 F | HEART RATE: 82 BPM | SYSTOLIC BLOOD PRESSURE: 108 MMHG

## 2023-06-06 DIAGNOSIS — R73.03 PREDIABETES: ICD-10-CM

## 2023-06-06 DIAGNOSIS — R29.898 LEFT LEG WEAKNESS: ICD-10-CM

## 2023-06-06 DIAGNOSIS — R29.898 LEFT LEG WEAKNESS: Primary | ICD-10-CM

## 2023-06-06 LAB
CHP ED QC CHECK: NORMAL
GLUCOSE BLD-MCNC: 103 MG/DL
HBA1C MFR BLD: 5.8 %

## 2023-06-06 PROCEDURE — 99214 OFFICE O/P EST MOD 30 MIN: CPT | Performed by: NURSE PRACTITIONER

## 2023-06-06 PROCEDURE — G8427 DOCREV CUR MEDS BY ELIG CLIN: HCPCS | Performed by: NURSE PRACTITIONER

## 2023-06-06 PROCEDURE — 4004F PT TOBACCO SCREEN RCVD TLK: CPT | Performed by: NURSE PRACTITIONER

## 2023-06-06 PROCEDURE — 72100 X-RAY EXAM L-S SPINE 2/3 VWS: CPT

## 2023-06-06 PROCEDURE — 83036 HEMOGLOBIN GLYCOSYLATED A1C: CPT | Performed by: NURSE PRACTITIONER

## 2023-06-06 PROCEDURE — 82962 GLUCOSE BLOOD TEST: CPT | Performed by: NURSE PRACTITIONER

## 2023-06-06 PROCEDURE — G8420 CALC BMI NORM PARAMETERS: HCPCS | Performed by: NURSE PRACTITIONER

## 2023-06-06 RX ORDER — MELATONIN
COMMUNITY
Start: 2023-03-06

## 2023-06-06 SDOH — ECONOMIC STABILITY: HOUSING INSECURITY
IN THE LAST 12 MONTHS, WAS THERE A TIME WHEN YOU DID NOT HAVE A STEADY PLACE TO SLEEP OR SLEPT IN A SHELTER (INCLUDING NOW)?: NO

## 2023-06-06 SDOH — ECONOMIC STABILITY: FOOD INSECURITY: WITHIN THE PAST 12 MONTHS, THE FOOD YOU BOUGHT JUST DIDN'T LAST AND YOU DIDN'T HAVE MONEY TO GET MORE.: NEVER TRUE

## 2023-06-06 SDOH — ECONOMIC STABILITY: FOOD INSECURITY: WITHIN THE PAST 12 MONTHS, YOU WORRIED THAT YOUR FOOD WOULD RUN OUT BEFORE YOU GOT MONEY TO BUY MORE.: NEVER TRUE

## 2023-06-06 SDOH — ECONOMIC STABILITY: INCOME INSECURITY: HOW HARD IS IT FOR YOU TO PAY FOR THE VERY BASICS LIKE FOOD, HOUSING, MEDICAL CARE, AND HEATING?: NOT VERY HARD

## 2023-06-06 ASSESSMENT — ENCOUNTER SYMPTOMS
GASTROINTESTINAL NEGATIVE: 1
EYES NEGATIVE: 1
RESPIRATORY NEGATIVE: 1

## 2023-06-06 NOTE — PROGRESS NOTES
alert.      Motor: Weakness (left leg) present. Gait: Gait normal.   Psychiatric:         Mood and Affect: Mood normal.         Behavior: Behavior normal.         Thought Content: Thought content normal.         Judgment: Judgment normal.          Assessment/Plan:  1. Left leg weakness  - XR LUMBAR SPINE (2-3 VIEWS); Future    2. Prediabetes  - POCT glycosylated hemoglobin (Hb A1C) -5.8  - POCT Glucose  - XR LUMBAR SPINE (2-3 VIEWS); Future  - Discussed dietary changes-handout given        Return if symptoms worsen or fail to improve. This dictation was generated by voice recognition computer software. Although all attempts are made to edit the dictation for accuracy, there may be errors in the transcription that are not intended.

## 2023-06-06 NOTE — PATIENT INSTRUCTIONS
Your A1c is 5.8 which makes you prediabetic. Make sure you are eating a low-carb diet. See the information attached to your summary. Complete your x-ray and follow-up with [place]. [facility] Laboratory Locations - No appointment necessary. @ indicates the location is open Saturdays in addition to Monday through Friday. Call your preferred location for test preparation, business hours and other information you need. [employer] accepts [employer]. [facility]     @ [address]. [address] [place] [address]    Ph: 28 [facility], 6500 [facility] [address]    Ph: [phone]   @ [address] [place] [address].,    [facility]    Ph: Erica [address]    Ph: [phone]  @ [address] [place] [address]   Ph: [phone]  @ [address] [place] [address]. Edgard Montes [address]    Ph: [address] [place], [place] [address]   Ph: [phone]    [place]    @ [facility]. [place], [place] [address]    Ph: [phone]  [place] [address] [facility], [address] [place] [address]   Ph: Ysitie [address] [place] [address]. [address]: 311 [facility] Dionicio [address]aya    Ph: [phone]   [facility]   [address] 2026 [facility]. Dionicio Asif   Ph: 501 [facility]  [address] [place], [place] [address]    Ph: [phone]

## 2023-06-07 DIAGNOSIS — M43.17 SPONDYLOLISTHESIS OF LUMBOSACRAL REGION: Primary | ICD-10-CM

## 2023-06-07 DIAGNOSIS — R29.898 LEFT LEG WEAKNESS: ICD-10-CM

## 2023-06-20 ENCOUNTER — HOSPITAL ENCOUNTER (OUTPATIENT)
Dept: CT IMAGING | Age: 43
Discharge: HOME | End: 2023-06-20
Payer: COMMERCIAL

## 2023-06-20 DIAGNOSIS — R63.4 WEIGHT LOSS: ICD-10-CM

## 2023-06-20 DIAGNOSIS — R91.8 PULMONARY NODULES: ICD-10-CM

## 2023-06-20 PROCEDURE — 71250 CT THORAX DX C-: CPT

## 2023-06-26 ENCOUNTER — OFFICE VISIT (OUTPATIENT)
Dept: ORTHOPEDIC SURGERY | Age: 43
End: 2023-06-26
Payer: COMMERCIAL

## 2023-06-26 VITALS — BODY MASS INDEX: 23.1 KG/M2 | HEIGHT: 71 IN | WEIGHT: 165 LBS

## 2023-06-26 DIAGNOSIS — M43.17 SPONDYLOLISTHESIS AT L5-S1 LEVEL: Primary | ICD-10-CM

## 2023-06-26 DIAGNOSIS — M54.16 LUMBAR RADICULOPATHY: ICD-10-CM

## 2023-06-26 PROCEDURE — 99204 OFFICE O/P NEW MOD 45 MIN: CPT | Performed by: PHYSICIAN ASSISTANT

## 2023-06-26 PROCEDURE — G8427 DOCREV CUR MEDS BY ELIG CLIN: HCPCS | Performed by: PHYSICIAN ASSISTANT

## 2023-06-26 PROCEDURE — G8420 CALC BMI NORM PARAMETERS: HCPCS | Performed by: PHYSICIAN ASSISTANT

## 2023-06-26 PROCEDURE — 4004F PT TOBACCO SCREEN RCVD TLK: CPT | Performed by: PHYSICIAN ASSISTANT

## 2023-08-03 ENCOUNTER — HOSPITAL ENCOUNTER (OUTPATIENT)
Dept: MRI IMAGING | Age: 43
Discharge: HOME | End: 2023-08-03
Payer: COMMERCIAL

## 2023-08-03 DIAGNOSIS — M54.16 LUMBAR RADICULOPATHY: ICD-10-CM

## 2023-08-03 DIAGNOSIS — M43.17 SPONDYLOLISTHESIS AT L5-S1 LEVEL: ICD-10-CM

## 2023-08-03 PROCEDURE — 72148 MRI LUMBAR SPINE W/O DYE: CPT

## 2023-08-24 ENCOUNTER — OFFICE VISIT (OUTPATIENT)
Dept: INTERNAL MEDICINE CLINIC | Age: 43
End: 2023-08-24
Payer: COMMERCIAL

## 2023-08-24 VITALS
HEIGHT: 71 IN | SYSTOLIC BLOOD PRESSURE: 120 MMHG | BODY MASS INDEX: 23.38 KG/M2 | HEART RATE: 71 BPM | OXYGEN SATURATION: 98 % | WEIGHT: 167 LBS | DIASTOLIC BLOOD PRESSURE: 80 MMHG

## 2023-08-24 DIAGNOSIS — R63.4 WEIGHT LOSS: ICD-10-CM

## 2023-08-24 DIAGNOSIS — M54.50 ACUTE LEFT-SIDED LOW BACK PAIN, UNSPECIFIED WHETHER SCIATICA PRESENT: Primary | ICD-10-CM

## 2023-08-24 DIAGNOSIS — J43.8 OTHER EMPHYSEMA (HCC): ICD-10-CM

## 2023-08-24 DIAGNOSIS — F17.210 SMOKES CIGARETTES: ICD-10-CM

## 2023-08-24 DIAGNOSIS — K22.4 ESOPHAGEAL DYSMOTILITY: ICD-10-CM

## 2023-08-24 DIAGNOSIS — E55.9 VITAMIN D DEFICIENCY: ICD-10-CM

## 2023-08-24 DIAGNOSIS — R73.03 PREDIABETES: ICD-10-CM

## 2023-08-24 DIAGNOSIS — G43.009 MIGRAINE WITHOUT AURA AND WITHOUT STATUS MIGRAINOSUS, NOT INTRACTABLE: ICD-10-CM

## 2023-08-24 LAB
CHP ED QC CHECK: NORMAL
GLUCOSE BLD-MCNC: 92 MG/DL

## 2023-08-24 PROCEDURE — 82962 GLUCOSE BLOOD TEST: CPT | Performed by: INTERNAL MEDICINE

## 2023-08-24 PROCEDURE — 3023F SPIROM DOC REV: CPT | Performed by: INTERNAL MEDICINE

## 2023-08-24 PROCEDURE — G8427 DOCREV CUR MEDS BY ELIG CLIN: HCPCS | Performed by: INTERNAL MEDICINE

## 2023-08-24 PROCEDURE — 4004F PT TOBACCO SCREEN RCVD TLK: CPT | Performed by: INTERNAL MEDICINE

## 2023-08-24 PROCEDURE — G8420 CALC BMI NORM PARAMETERS: HCPCS | Performed by: INTERNAL MEDICINE

## 2023-08-24 PROCEDURE — 99214 OFFICE O/P EST MOD 30 MIN: CPT | Performed by: INTERNAL MEDICINE

## 2023-08-24 RX ORDER — M-VIT,TX,IRON,MINS/CALC/FOLIC 27MG-0.4MG
1 TABLET ORAL DAILY
Qty: 30 TABLET | Refills: 3 | Status: SHIPPED | OUTPATIENT
Start: 2023-08-24 | End: 2024-08-23

## 2023-08-24 RX ORDER — FAMOTIDINE 20 MG/1
20 TABLET, FILM COATED ORAL 2 TIMES DAILY
Qty: 60 TABLET | Refills: 3 | Status: SHIPPED | OUTPATIENT
Start: 2023-08-24

## 2023-08-24 RX ORDER — TIOTROPIUM BROMIDE 18 UG/1
18 CAPSULE ORAL; RESPIRATORY (INHALATION) DAILY
Qty: 30 CAPSULE | Refills: 3 | Status: SHIPPED | OUTPATIENT
Start: 2023-08-24

## 2023-08-24 RX ORDER — IBUPROFEN 800 MG/1
800 TABLET ORAL EVERY 8 HOURS PRN
Qty: 60 TABLET | Refills: 0 | Status: SHIPPED | OUTPATIENT
Start: 2023-08-24

## 2023-08-24 NOTE — PATIENT INSTRUCTIONS
TAKE MED AS ADVISED    DIET/ EXERCISE. FOLLOW UP WITHIN 3 MONTHS / AS NEEDED    FOLLOW UP FOR LABS, [address] [place] [address] Laboratory Locations - No appointment necessary. ? indicates the location is open Saturdays in addition to Monday through Friday. Call your preferred location for test preparation, business hours and other information you need. [employer] accepts [employer]. CENTRAL  EAST  [place]    ? [place] [id number] 45 [facility]. [facility], [address]    Ph: 2000 Lola Mai [facility], [address] [facility] [address]    Ph: [phone]   ? [address]    [place], [address] [place] [address]    Ph: [address], [address] [facility] [address]    Ph: [phone] ? [place]   [address]   Ph: [phone]  ? 707 [facility], 211 [facility]    Ph: [place] 201 [facility], 1235 [facility]   Ph: [phone]    NORTH    ? [place], [place] [address]    Ph: [phone]  [facility]   1221 [facility], [facility]   Ph: [address] Ter[address]. [place], [address] [facility]: 907 [address]    Ph: 713 [facility].  [address] [place] [address]    Ph: [phone]

## 2023-11-15 ENCOUNTER — TELEPHONE (OUTPATIENT)
Dept: INTERNAL MEDICINE CLINIC | Age: 43
End: 2023-11-15

## 2023-11-15 ENCOUNTER — TELEPHONE (OUTPATIENT)
Dept: ORTHOPEDIC SURGERY | Age: 43
End: 2023-11-15

## 2023-11-15 NOTE — TELEPHONE ENCOUNTER
Patient goes to court Tuesday and is requesting a letter stating that he has back issues that his physician is aware of due to him being arrested and charged with a dui while walking across the street from his job by one of the [facility] please advise.

## 2023-11-15 NOTE — TELEPHONE ENCOUNTER
General Question     Subject: LUMBAR   Patient and /or Facility Request: Marina Bonner  Contact Number: [phone]    PATIENT CALLING REQUESTING A DOCTOR'S NOTE FROM Awa Pickering FOR HIS LUMBAR SPINE     PATIENT STATES THAT HE WAS COMING OUT OF [employer] AND THE POLICE WAS WATCHING HIM OUT AND STATED THAT HE WAS WALKING FUNNY LIKE HE WAS UNDER THE INFLUENCE AND PATIENT STATES THAT HE HAS COURT FOR THIS ISSUE     PLEASE CALL THE PATIENT BACK AT THE ABOVE NUMBER

## 2023-11-20 ENCOUNTER — OFFICE VISIT (OUTPATIENT)
Dept: ORTHOPEDIC SURGERY | Age: 43
End: 2023-11-20
Payer: COMMERCIAL

## 2023-11-20 VITALS — HEIGHT: 71 IN | WEIGHT: 167 LBS | BODY MASS INDEX: 23.38 KG/M2 | RESPIRATION RATE: 16 BRPM

## 2023-11-20 DIAGNOSIS — M43.17 SPONDYLOLISTHESIS AT L5-S1 LEVEL: Primary | ICD-10-CM

## 2023-11-20 DIAGNOSIS — M48.061 FORAMINAL STENOSIS OF LUMBAR REGION: ICD-10-CM

## 2023-11-20 DIAGNOSIS — M21.372 FOOT DROP, LEFT FOOT: ICD-10-CM

## 2023-11-20 PROCEDURE — 99213 OFFICE O/P EST LOW 20 MIN: CPT | Performed by: PHYSICIAN ASSISTANT

## 2023-11-20 PROCEDURE — G8484 FLU IMMUNIZE NO ADMIN: HCPCS | Performed by: PHYSICIAN ASSISTANT

## 2023-11-20 PROCEDURE — G8420 CALC BMI NORM PARAMETERS: HCPCS | Performed by: PHYSICIAN ASSISTANT

## 2023-11-20 PROCEDURE — G8428 CUR MEDS NOT DOCUMENT: HCPCS | Performed by: PHYSICIAN ASSISTANT

## 2023-11-20 PROCEDURE — 4004F PT TOBACCO SCREEN RCVD TLK: CPT | Performed by: PHYSICIAN ASSISTANT

## 2023-11-22 ENCOUNTER — PROCEDURE VISIT (OUTPATIENT)
Dept: NEUROLOGY | Age: 43
End: 2023-11-22
Payer: COMMERCIAL

## 2023-11-22 DIAGNOSIS — M79.605 LEFT LEG PAIN: Primary | ICD-10-CM

## 2023-11-22 PROCEDURE — 95886 MUSC TEST DONE W/N TEST COMP: CPT | Performed by: PSYCHIATRY & NEUROLOGY

## 2023-11-22 PROCEDURE — 95908 NRV CNDJ TST 3-4 STUDIES: CPT | Performed by: PSYCHIATRY & NEUROLOGY

## 2023-11-22 NOTE — PROGRESS NOTES
Eldridge Skiff, M.D. [facility]  Board Certified in [address], [address] [facility], 713 [facility]    EMG / NERVE CONDUCTION STUDY      PATIENT:  Soraya Miller       DATE OF EM23     YOB: 1980       REASON FOR EMG:   Low back pain and left leg pain      REFERRING PHYSICIAN:  CHE Jonas  [address] [facility]  [facility],  [address] [place] [address]     SUMMARY:   The left peroneal and posterior tibial motor nerve studies were not recordable. The left superficial peroneal sensory nerve study had a borderline distal latency. The left sural sensory nerve study was non-recordable. Needle EMG of several muscles in the left lower extremity showed evidence of denervation in the S1 innervated muscles. Needle EMG of the left lumbar paraspinal muscles showed evidence of mild denervation. CLINICAL DIAGNOSIS:  Neuropathy versus radiculopathy        EMG RESULTS:     1. This patient has electrophysiological evidence of a left S1 nerve root lesion. 2.  This patient also has a generalized sensorimotor polyneuropathy in the left lower extremity.        ---------------------------------------------  Eldridge Skiff, M.D.   Electromyographer / Neurologist

## 2023-12-06 ENCOUNTER — OFFICE VISIT (OUTPATIENT)
Dept: INTERNAL MEDICINE CLINIC | Age: 43
End: 2023-12-06
Payer: COMMERCIAL

## 2023-12-06 VITALS
SYSTOLIC BLOOD PRESSURE: 120 MMHG | DIASTOLIC BLOOD PRESSURE: 78 MMHG | WEIGHT: 168 LBS | HEART RATE: 97 BPM | BODY MASS INDEX: 23.43 KG/M2 | OXYGEN SATURATION: 97 %

## 2023-12-06 DIAGNOSIS — K22.4 ESOPHAGEAL DYSMOTILITY: ICD-10-CM

## 2023-12-06 DIAGNOSIS — R73.03 PREDIABETES: ICD-10-CM

## 2023-12-06 DIAGNOSIS — E55.9 VITAMIN D DEFICIENCY: ICD-10-CM

## 2023-12-06 DIAGNOSIS — F17.210 SMOKES CIGARETTES: ICD-10-CM

## 2023-12-06 DIAGNOSIS — J43.8 OTHER EMPHYSEMA (HCC): Primary | ICD-10-CM

## 2023-12-06 DIAGNOSIS — G43.009 MIGRAINE WITHOUT AURA AND WITHOUT STATUS MIGRAINOSUS, NOT INTRACTABLE: ICD-10-CM

## 2023-12-06 DIAGNOSIS — M54.42 ACUTE LEFT-SIDED LOW BACK PAIN WITH LEFT-SIDED SCIATICA: ICD-10-CM

## 2023-12-06 PROCEDURE — G8484 FLU IMMUNIZE NO ADMIN: HCPCS | Performed by: INTERNAL MEDICINE

## 2023-12-06 PROCEDURE — 99406 BEHAV CHNG SMOKING 3-10 MIN: CPT | Performed by: INTERNAL MEDICINE

## 2023-12-06 PROCEDURE — 99214 OFFICE O/P EST MOD 30 MIN: CPT | Performed by: INTERNAL MEDICINE

## 2023-12-06 PROCEDURE — G8420 CALC BMI NORM PARAMETERS: HCPCS | Performed by: INTERNAL MEDICINE

## 2023-12-06 PROCEDURE — 4004F PT TOBACCO SCREEN RCVD TLK: CPT | Performed by: INTERNAL MEDICINE

## 2023-12-06 PROCEDURE — 3023F SPIROM DOC REV: CPT | Performed by: INTERNAL MEDICINE

## 2023-12-06 PROCEDURE — G8427 DOCREV CUR MEDS BY ELIG CLIN: HCPCS | Performed by: INTERNAL MEDICINE

## 2023-12-06 RX ORDER — FAMOTIDINE 20 MG/1
20 TABLET, FILM COATED ORAL 2 TIMES DAILY
Qty: 60 TABLET | Refills: 3 | Status: SHIPPED | OUTPATIENT
Start: 2023-12-06

## 2023-12-06 RX ORDER — TIOTROPIUM BROMIDE 18 UG/1
18 CAPSULE ORAL; RESPIRATORY (INHALATION) DAILY
Qty: 30 CAPSULE | Refills: 3 | Status: SHIPPED | OUTPATIENT
Start: 2023-12-06

## 2023-12-06 NOTE — PATIENT INSTRUCTIONS
TAKE MED AS ADVISED    DIET/ EXERCISE. FOLLOW UP WITHIN 4 MONTHS / AS NEEDED    FOLLOW UP FOR  LABS, 3500 [facility] Laboratory Locations - No appointment necessary. ? indicates the location is open Saturdays in addition to Monday through Friday. Call your preferred location for test preparation, business hours and other information you need. [employer] accepts [employer]. CENTRAL  EAST  [place]    ? [place] [address] [address]. [facility], [address]    Ph: 2000 Lola Mai [facility], [address]    Ph: [phone]   ? 433 [facility].,    [place], [address] [place] [address]    Ph: 1700 [address]ric[address], [address] [facility]    Ph: [phone] ? [place]   1600 [facility] [address] [place], [address] [place] [address]   Ph: [phone]  ? [address] [place] [address], 211 [facility]    Ph: [place] 201 [facility], 1235 [facility]   Ph: [phone]    NORTH    ? [facility] [address]., [place] [address]    Ph: [phone]  [facility]   1221 [facility], [facility]5 [address]   Ph: Lane [address], [address]    [address] [facility]: 942 [address] Anj[address] [place], [facility]5 [facility]    Ph: 713 [facility].  [address] [place] [address]    Ph: [phone]

## 2023-12-06 NOTE — PROGRESS NOTES
SUBJECTIVE:  Erica Walton is a 37 y.o. male HERE FOR   Chief Complaint   Patient presents with    Follow-up        PT HERE FOR EVAL     EMPHYSEMA - NO WHEEZING, NO SOB, NO INCREASED INHALER USE.    LOW BACK PAIN - LT SIDE. PERSISTENT. SHARP PAIN, + RAD TO LLE, PAIN SCALE 7/10.  + NUMBNESS /  TINGLING LLE - S/P ORTHO EVAL. REFERRED TO [place] [facility] PER ORTHO. STATES ADVISED NEED REFERRAL FROM PCP. PT REQUESTING NOTE STATING WITH REFERENCE TO LOW BACK PAIN FOR LEGAL REASONS  PREDIABETES  - LAB D/W PT. + FH DM. DIET / EXERCISE REVIEWED  VIT D DEF - STATES TAKING MED. LAB D/W PT  ESOPHAGEAL DYSMOTILITY - DYSPHAGIA - IMPROVED. MED HELPING  MIGRAINE HA - INTERMITTENT. LAST EPISODE -  3 MONTHS  + SMOKER - CESSATION READDRESSED     DENIES CP, No SOB, No PALPITATIONS, No COUGH, NO F/C  No ABD PAIN, No N/V, No DIARRHEA, No CONSTIPATION, No MELENA, No HEMATOCHEZIA. No DYSURIA, No FREQ, No URGENCY, No HEMATURIA    PMH: REVIEWED AND UPDATED TODAY    PSH: REVIEWED AND UPDATED TODAY    SOCIAL HX: REVIEWED AND UPDATED TODAY    FAMILY HX: REVIEWED AND UPDATED TODAY    ALLERGY:  Pcn [penicillins]    MEDS: REVIEWED  Prior to Visit Medications    Medication Sig Taking?  Authorizing Provider   ibuprofen (ADVIL;MOTRIN) 800 MG tablet Take 1 tablet by mouth every 8 hours as needed for Pain Yes Re Cuevas MD   tiotropium (Neto Moe) 18 MCG inhalation capsule Inhale 1 capsule into the lungs daily Yes Re Cuevas MD   Cholecalciferol (VITAMIN D3) 25 MCG (1000 UT) CAPS Take 1 capsule by mouth daily Yes Re Cuevas MD   Multiple Vitamins-Minerals (THERAPEUTIC MULTIVITAMIN-MINERALS) tablet Take 1 tablet by mouth daily Yes Re Cuevas MD   famotidine (PEPCID) 20 MG tablet Take 1 tablet by mouth 2 times daily Yes Re Cuevas MD   erythromycin [facility]) 5 MG/GM ophthalmic ointment Apply to eye Yes Provider, MD Bertha       ROS: COMPREHENSIVE ROS AS IN HX, REST -VE  History obtained from chart

## 2024-01-10 ENCOUNTER — HOSPITAL ENCOUNTER (OUTPATIENT)
Dept: CT IMAGING | Age: 44
Discharge: HOME | End: 2024-01-10
Payer: COMMERCIAL

## 2024-01-10 DIAGNOSIS — M48.061 SPINAL STENOSIS OF LUMBAR REGION WITHOUT NEUROGENIC CLAUDICATION: ICD-10-CM

## 2024-01-10 DIAGNOSIS — M21.372 LEFT FOOT DROP: ICD-10-CM

## 2024-01-10 DIAGNOSIS — M54.16 LUMBAR RADICULOPATHY: ICD-10-CM

## 2024-01-10 DIAGNOSIS — M43.17 SPONDYLOLISTHESIS OF LUMBOSACRAL REGION: ICD-10-CM

## 2024-01-10 DIAGNOSIS — M54.50 LOW BACK PAIN, UNSPECIFIED BACK PAIN LATERALITY, UNSPECIFIED CHRONICITY, UNSPECIFIED WHETHER SCIATICA PRESENT: ICD-10-CM

## 2024-01-10 PROCEDURE — 72131 CT LUMBAR SPINE W/O DYE: CPT

## 2024-02-23 DIAGNOSIS — R63.4 WEIGHT LOSS: ICD-10-CM

## 2024-02-23 DIAGNOSIS — D70.9 NEUTROPENIA, UNSPECIFIED TYPE (HCC): ICD-10-CM

## 2024-02-23 DIAGNOSIS — E55.9 VITAMIN D DEFICIENCY: ICD-10-CM

## 2024-02-24 LAB
25(OH)D3 SERPL-MCNC: 25.4 NG/ML
ALBUMIN SERPL-MCNC: 4 G/DL (ref 3.4–5)
ALBUMIN/GLOB SERPL: 1.3 {RATIO} (ref 1.1–2.2)
ALP SERPL-CCNC: 64 U/L (ref 40–129)
ALT SERPL-CCNC: 13 U/L (ref 10–40)
ANION GAP SERPL CALCULATED.3IONS-SCNC: 6 MMOL/L (ref 3–16)
AST SERPL-CCNC: 13 U/L (ref 15–37)
BASOPHILS # BLD: 0 K/UL (ref 0–0.2)
BASOPHILS NFR BLD: 0.2 %
BILIRUB SERPL-MCNC: <0.2 MG/DL (ref 0–1)
BUN SERPL-MCNC: 20 MG/DL (ref 7–20)
CALCIUM SERPL-MCNC: 9.1 MG/DL (ref 8.3–10.6)
CHLORIDE SERPL-SCNC: 109 MMOL/L (ref 99–110)
CO2 SERPL-SCNC: 27 MMOL/L (ref 21–32)
CREAT SERPL-MCNC: 0.9 MG/DL (ref 0.9–1.3)
DEPRECATED RDW RBC AUTO: 14.5 % (ref 12.4–15.4)
EOSINOPHIL # BLD: 0.1 K/UL (ref 0–0.6)
EOSINOPHIL NFR BLD: 1.4 %
GFR SERPLBLD CREATININE-BSD FMLA CKD-EPI: >60 ML/MIN/{1.73_M2}
GLUCOSE SERPL-MCNC: 98 MG/DL (ref 70–99)
HCT VFR BLD AUTO: 40 % (ref 40.5–52.5)
HGB BLD-MCNC: 13.9 G/DL (ref 13.5–17.5)
LYMPHOCYTES # BLD: 1.3 K/UL (ref 1–5.1)
LYMPHOCYTES NFR BLD: 30.1 %
MCH RBC QN AUTO: 32.7 PG (ref 26–34)
MCHC RBC AUTO-ENTMCNC: 34.7 G/DL (ref 31–36)
MCV RBC AUTO: 94.3 FL (ref 80–100)
MONOCYTES # BLD: 0.2 K/UL (ref 0–1.3)
MONOCYTES NFR BLD: 4.9 %
NEUTROPHILS # BLD: 2.8 K/UL (ref 1.7–7.7)
NEUTROPHILS NFR BLD: 63.4 %
PLATELET # BLD AUTO: 185 K/UL (ref 135–450)
PMV BLD AUTO: 8.6 FL (ref 5–10.5)
POTASSIUM SERPL-SCNC: 4.3 MMOL/L (ref 3.5–5.1)
PROT SERPL-MCNC: 7.1 G/DL (ref 6.4–8.2)
RBC # BLD AUTO: 4.24 M/UL (ref 4.2–5.9)
SODIUM SERPL-SCNC: 142 MMOL/L (ref 136–145)
WBC # BLD AUTO: 4.4 K/UL (ref 4–11)

## 2024-02-26 ENCOUNTER — HOSPITAL ENCOUNTER (OUTPATIENT)
Dept: PHYSICAL THERAPY | Age: 44
Setting detail: THERAPIES SERIES
Discharge: HOME | End: 2024-02-26
Payer: COMMERCIAL

## 2024-02-26 DIAGNOSIS — R53.1 DECREASED STRENGTH: ICD-10-CM

## 2024-02-26 DIAGNOSIS — R26.9 GAIT ABNORMALITY: ICD-10-CM

## 2024-02-26 DIAGNOSIS — M21.372 FOOT DROP, LEFT FOOT: ICD-10-CM

## 2024-02-26 DIAGNOSIS — R52 INCREASED PAIN: Primary | ICD-10-CM

## 2024-02-26 PROCEDURE — 97161 PT EVAL LOW COMPLEX 20 MIN: CPT | Performed by: PHYSICAL THERAPIST

## 2024-02-26 PROCEDURE — 97110 THERAPEUTIC EXERCISES: CPT | Performed by: PHYSICAL THERAPIST

## 2024-02-26 NOTE — THERAPY EVALUATION
Gout        Neurological conditions  [] Prior Stroke (I69.30)  [] Parkinson's (G20)  [] Encephalopathy (G93.40)  [] Multiple Sclerosis (G35)  [] Post-polio (G14)  [] Spinal cord injury (SCI)  [] Traumatic brain injury (TBI)  [] ALS    Gastrointestinal conditions   [] Constipation (K59.00)  [] Chron's/ulcerative colitis    Endocrine conditions   [] Hypothyroid (E03.9)  [] Hyperthyroid [] Hx of COVID    Musculoskeletal conditions  [] Disc pathology   [] Congenital spine pathologies   [] Osteoporosis (M81.8)  [] Osteopenia (M85.8)  [] Scoliosis    Cardio/Pulmonary conditions  [] Asthma (J45)  [] Coughing   [] COPD (J44.9)  [] CHF  [] A-fib          Rheumatological conditions  [] Fibromyalgia (M79.7)  [] Lupus  [] Sjogrens  [] Ankylosing spondylitis  [] Other autoimmune       Metabolic conditions  [] Morbid obesity (E66.01)  [] Diabetes type 1(E10.65) or 2 (E11.65)   [x] Neuropathy (G60.9)        Cardiovascular conditions  [] Hypertension (I10)  [] Hyperlipidemia (E78.5)  [] Angina pectoris (I20)  [] Atherosclerosis (I70)  [] Pacemaker/Defib  [] Hx of CABG/stent/cardiac surgeries        Developmental Disorders  [] Autism (F84.0)  [] Cerebral Palsy (G80)  [] Down Syndrome (Q90.9)  [] Developmental delay     Psychological Disorders  [] Anxiety (F41.9)  [x] Depression (F32.9)   [] Bipolar  [] Other:      Prior surgeries  [] Involved limb  [] Previous spinal surgery  []  section birth  [] Hysterectomy  [] Bowel / bladder surgery  [] Other relevant surgeries        Other conditions  [] Vertigo  [] Syncope  [] Kidney Failure  [] Cancer  [] Pregnancy  [] Incontinence   Other Co-morbidities not listed: tobacco use      OBJECTIVE EXAMINATION     24  ROM/Strength: (Blank cells denote NT)       Mvmt (norm) ROM L ROM R Notes MMT L MMT R Notes     CERVICAL Flex (60)        Ext (70)        SB(45)          Rotation (80)                 SHOULDER Flexion (180)          Abduction (180)          ER -0          ER -90 (90)

## 2024-02-28 ENCOUNTER — APPOINTMENT (OUTPATIENT)
Dept: PHYSICAL THERAPY | Age: 44
End: 2024-02-28
Payer: COMMERCIAL

## 2024-03-07 ENCOUNTER — TELEPHONE (OUTPATIENT)
Dept: INTERNAL MEDICINE CLINIC | Age: 44
End: 2024-03-07

## 2024-03-07 ENCOUNTER — HOSPITAL ENCOUNTER (OUTPATIENT)
Dept: PHYSICAL THERAPY | Age: 44
Setting detail: THERAPIES SERIES
Discharge: HOME | End: 2024-03-07
Payer: COMMERCIAL

## 2024-03-07 PROCEDURE — 97110 THERAPEUTIC EXERCISES: CPT | Performed by: PHYSICAL THERAPIST

## 2024-03-07 PROCEDURE — 97530 THERAPEUTIC ACTIVITIES: CPT | Performed by: PHYSICAL THERAPIST

## 2024-03-07 NOTE — THERAPY EVALUATION
[facility] [address], [place] [address] office: [phone] fax: [phone]     Physical Therapy Initial Evaluation Certification      Dear Patrick Conde,    We had the pleasure of evaluating the following patient for physical therapy services at [facility].  A summary of our findings can be found in the initial assessment below.  This includes our plan of care.  If you have any questions or concerns regarding these findings, please do not hesitate to contact me at the office phone number listed above.  Thank you for the referral.     Physician Signature:_______________________________Date:__________________  By signing above (or electronic signature), therapist’s plan is approved by physician       Physical Therapy: TREATMENT/PROGRESS NOTE   Patient: Juliocesar Freire (43 y.o. male)   Examination Date: 2024   :  1980 MRN: [id number]   Visit #: 2   Insurance Allowable Auth Needed   30 []Yes    []No    Insurance: [facility] / Plan: [employer] MEDICA[place] / Product Type: *No Product type* /   Insurance ID: [id number] - (Medicaid Managed)  Secondary Insurance (if applicable):    Treatment Diagnosis:    No diagnosis found.     Medical Diagnosis:  Radiculopathy, lumbosacral region [M54.17]   Referring Physician: Patrick Conde  PCP: Felicita Brooks MD       Plan of care signed (Y/N):     Date of Patient follow up with Physician:      Progress Report/POC: EVAL today  POC update due: (10 visits /OR AUTH LIMITS, whichever is less)   2024                                             Precautions/ Contra-indications:           Latex allergy:  NO  Pacemaker:    NO  Contraindications for Manipulation: None and NA  Date of Surgery:    Other:    Red Flags:  None    C-SSRS Triggered by Intake questionnaire:   [x] No, Questionnaire did not trigger screening.   [] Yes, Patient intake triggered further

## 2024-03-07 NOTE — TELEPHONE ENCOUNTER
Submitted PA for Spiriva HandiHaler 18MCG capsules  Via CM Key: [id number] STATUS: PENDING.    Follow up done daily; if no decision with in three days we will refax.  If another three days goes by with no decision will call the insurance for status.

## 2024-03-07 NOTE — TELEPHONE ENCOUNTER
tiotropium (SPIRIVA HANDIHALER) 18 MCG inhalation capsule [[id number]]    Order Details  Dose: 18 mcg Route: Inhalation Frequency: DAILY   Dispense Quantity: 30 capsule Refills: 3          Sig: Inhale 1 capsule into the lungs daily         Start Date: 12/06/23 End Date: --   Written Date: 12/06/23 Expiration Date: 12/05/24       Associated Diagnoses: Other emphysema (HCC) [J43.8]   Original Order: tiotropium (SPIRIVA HANDIHALER) 18 MCG inhalation capsule [[id number]]   Providers    Authorizing Provider: Felicita Brooks MD  NPI: [id number]   Ordering User: Felicita Brooks MD          Pharmacy    [facility] [id number] [place] [address] - P [phone] - F [phone]  [address] [facility] [address]  Phone: [phone]  Fax: [phone]

## 2024-03-08 NOTE — TELEPHONE ENCOUNTER
Per Serjio: PA Not Required for Spiriva HandiHaler 18MCG capsules; letter attached.    If this requires a response please respond to the pool ( P MHCX PSC MEDICATION PRE-AUTH).      Thank you please advise patient.

## 2024-03-15 ENCOUNTER — HOSPITAL ENCOUNTER (OUTPATIENT)
Dept: PHYSICAL THERAPY | Age: 44
Setting detail: THERAPIES SERIES
Discharge: HOME | End: 2024-03-15
Payer: COMMERCIAL

## 2024-03-15 PROCEDURE — 97110 THERAPEUTIC EXERCISES: CPT | Performed by: PHYSICAL THERAPIST

## 2024-03-15 PROCEDURE — 97530 THERAPEUTIC ACTIVITIES: CPT | Performed by: PHYSICAL THERAPIST

## 2024-03-15 NOTE — THERAPY EVALUATION
following either an injury or surgery.   (91109) HOME EXERCISE PROGRAM - Reviewed/Progressed HEP activities related to strengthening, flexibility, endurance, ROM performed to prevent loss of range of motion, maintain or improve muscular strength or increase flexibility, following either an injury or surgery.  (37347) NEUROMUSCULAR RE-EDUCATION - Therapeutic procedure, 1 or more areas, each 15 minutes; neuromuscular reeducation of movement, balance, coordination, kinesthetic sense, posture, and/or proprioception for sitting and/or standing activities  (54237) HOME EXERCISE PROGRAM - Reviewed/Progressed HEP activities related to neuromuscular reeducation of movement, balance, coordination, kinesthetic sense, posture, and/or proprioception for sitting and/or standing activities    (25770) THERAPEUTIC ACTIVITY - use of dynamic activities to improve functional performance. (Ex include squatting, ascending/descending stairs, walking, bending, lifting, catching, throwing, pushing, pulling, jumping.)  Direct, one on one contact, billed in 15-minute increments.  (84414) GAIT RE-EDUCATION - Provided training and instruction to the patient for proper joint and muscle recruitment and positioning and eccentric body weight control with ambulation re-education including up and down stairs. Therapeutic procedure, one or more areas, each 15 minutes;       GOALS     Patient stated goal: everyday living.   Status:  [] Progressing: [] Met: [] Not Met: [] Adjusted    Therapist goals for Patient:   Short Term Goals: To be achieved in: 2 weeks  Independent in HEP and progression per patient tolerance, in order to progress toward full function and prevent re-injury.    Status: [] Progressing: [] Met: [] Not Met: [] Adjusted  Patient will have a decrease in pain to 2/10 to help facilitate improvement in movement, function, and ADLs as indicated by functional deficits.   Status: [] Progressing: [] Met: [] Not Met: [] Adjusted    Long Term

## 2024-03-22 ENCOUNTER — HOSPITAL ENCOUNTER (OUTPATIENT)
Dept: PHYSICAL THERAPY | Age: 44
Setting detail: THERAPIES SERIES
Discharge: HOME | End: 2024-03-22
Payer: COMMERCIAL

## 2024-03-22 PROCEDURE — 97116 GAIT TRAINING THERAPY: CPT | Performed by: PHYSICAL THERAPIST

## 2024-03-22 PROCEDURE — 97110 THERAPEUTIC EXERCISES: CPT | Performed by: PHYSICAL THERAPIST

## 2024-03-22 PROCEDURE — 97112 NEUROMUSCULAR REEDUCATION: CPT | Performed by: PHYSICAL THERAPIST

## 2024-03-22 NOTE — FLOWSHEET NOTE
or improve muscular strength or increase flexibility, following either an injury or surgery.   (67711) HOME EXERCISE PROGRAM - Reviewed/Progressed HEP activities related to strengthening, flexibility, endurance, ROM performed to prevent loss of range of motion, maintain or improve muscular strength or increase flexibility, following either an injury or surgery.  (06610) NEUROMUSCULAR RE-EDUCATION - Therapeutic procedure, 1 or more areas, each 15 minutes; neuromuscular reeducation of movement, balance, coordination, kinesthetic sense, posture, and/or proprioception for sitting and/or standing activities  (63613) HOME EXERCISE PROGRAM - Reviewed/Progressed HEP activities related to neuromuscular reeducation of movement, balance, coordination, kinesthetic sense, posture, and/or proprioception for sitting and/or standing activities    (77458) THERAPEUTIC ACTIVITY - use of dynamic activities to improve functional performance. (Ex include squatting, ascending/descending stairs, walking, bending, lifting, catching, throwing, pushing, pulling, jumping.)  Direct, one on one contact, billed in 15-minute increments.  (51010) GAIT RE-EDUCATION - Provided training and instruction to the patient for proper joint and muscle recruitment and positioning and eccentric body weight control with ambulation re-education including up and down stairs. Therapeutic procedure, one or more areas, each 15 minutes;       GOALS     Patient stated goal: everyday living.   Status:  [] Progressing: [] Met: [] Not Met: [] Adjusted    Therapist goals for Patient:   Short Term Goals: To be achieved in: 2 weeks  Independent in HEP and progression per patient tolerance, in order to progress toward full function and prevent re-injury.    Status: [] Progressing: [] Met: [] Not Met: [] Adjusted  Patient will have a decrease in pain to 2/10 to help facilitate improvement in movement, function, and ADLs as indicated by functional deficits.   Status: []

## 2024-03-29 ENCOUNTER — HOSPITAL ENCOUNTER (OUTPATIENT)
Dept: PHYSICAL THERAPY | Age: 44
Setting detail: THERAPIES SERIES
Discharge: HOME | End: 2024-03-29
Payer: COMMERCIAL

## 2024-03-29 PROCEDURE — 97110 THERAPEUTIC EXERCISES: CPT | Performed by: PHYSICAL THERAPIST

## 2024-03-29 PROCEDURE — 97530 THERAPEUTIC ACTIVITIES: CPT | Performed by: PHYSICAL THERAPIST

## 2024-03-29 NOTE — FLOWSHEET NOTE
activities    (96203) THERAPEUTIC ACTIVITY - use of dynamic activities to improve functional performance. (Ex include squatting, ascending/descending stairs, walking, bending, lifting, catching, throwing, pushing, pulling, jumping.)  Direct, one on one contact, billed in 15-minute increments.  (81362) GAIT RE-EDUCATION - Provided training and instruction to the patient for proper joint and muscle recruitment and positioning and eccentric body weight control with ambulation re-education including up and down stairs. Therapeutic procedure, one or more areas, each 15 minutes;       GOALS     Patient stated goal: everyday living.   Status:  [] Progressing: [] Met: [] Not Met: [] Adjusted    Therapist goals for Patient:   Short Term Goals: To be achieved in: 2 weeks  Independent in HEP and progression per patient tolerance, in order to progress toward full function and prevent re-injury.    Status: [] Progressing: [] Met: [] Not Met: [] Adjusted  Patient will have a decrease in pain to 2/10 to help facilitate improvement in movement, function, and ADLs as indicated by functional deficits.   Status: [] Progressing: [] Met: [] Not Met: [] Adjusted    Long Term Goals: To be achieved in: 8-10 weeks  Disability index score of 25% or less for the Modified Oswestry to assist with return top prior level of function.   Status: [] Progressing: [] Met: [] Not Met: [] Adjusted  LLE AROM = RLE AROM to allow for proper joint functioning as indicated by patients functional deficits.  Status: [] Progressing: [] Met: [] Not Met: [] Adjusted  Pt to improve strength to WFL of proximal hip, TrA/abdominal, posterior chain LE, quadriceps, gastroc/soleus, and hamstrings to allow for proper muscle and joint use in functional mobility, ADLs and prior level of function   Status: [] Progressing: [] Met: [] Not Met: [] Adjusted  Patient will return to  Usual work, housework or activities, walking around house, lifting an object from the floor,

## 2024-04-02 ENCOUNTER — HOSPITAL ENCOUNTER (OUTPATIENT)
Dept: PHYSICAL THERAPY | Age: 44
Setting detail: THERAPIES SERIES
Discharge: HOME | End: 2024-04-02
Payer: COMMERCIAL

## 2024-04-02 PROCEDURE — 97110 THERAPEUTIC EXERCISES: CPT | Performed by: PHYSICAL THERAPIST

## 2024-04-02 PROCEDURE — 97530 THERAPEUTIC ACTIVITIES: CPT | Performed by: PHYSICAL THERAPIST

## 2024-04-02 NOTE — FLOWSHEET NOTE
[FreeTextEntry1] : 21 month old  female , PMHX significant for delayed vaccination, presenting to review lab work.\par \par Egg Allergy: Based on reaction to egg and positive blood test for egg allergy advised mother to avoid all egg products. As blood test positive for ovomucoid, advised to avoid even baked egg items. Discussed with mother further the influenza vaccine and reaction to egg allergy. To rx Benadryl in the event of allergic reaction. Discussed when to go to emergency room, and discussed signs and symptoms of allergic reaction. To give referral for allergy immunology to discuss egg allergy further. Child is less than 15kg and therefore does not qualify for epipen mil[place], will provide rx once at appropriate weight and advised mother to discuss this further with allergy specialist. \par \par All questions and concerns addressed, mother understood and agreed with plan. 
musculoskeletal condition(s) with a corresponding ICD-10 code that is of complexity and severity that require skilled therapeutic intervention. This has a direct and significant impact on the need for therapy and significantly impacts the rate of recovery.       Return to Play: NA    Prognosis for POC: [x] Good [] Fair  [] Poor    Patient requires continued skilled intervention: [x] Yes  [] No      CHARGE CAPTURE     PT CHARGE GRID   CPT Code (TIMED) minutes # CPT Code (UNTIMED) #     Therex (11959)  30 2  EVAL:LOW (16866 - Typically 20 minutes face-to-face)     Neuromusc. Re-ed (03387)    Re-Eval (04061)     Manual (30049)    Estim Unattended (69779)     Ther. Act (13693) 25 2  Mech. Traction (74060)     Gait (02226)    Dry Needle 1-2 muscle (20560)     Aquatic Therex (19601)    Dry Needle 3+ muscle (20561)     Iontophoresis (96318)    VASO (62550)     Ultrasound (75525)    Group Therapy (40126)     Estim Attended (44389)    Canalith Repositioning (94176)     Other:    Other:    Total Timed Code Tx Minutes 55 4       Total Treatment Minutes 55        Charge Justification:  (98191) THERAPEUTIC EXERCISE - Provided verbal/tactile cueing for activities related to strengthening, flexibility, endurance, ROM performed to prevent loss of range of motion, maintain or improve muscular strength or increase flexibility, following either an injury or surgery.   (54160) HOME EXERCISE PROGRAM - Reviewed/Progressed HEP activities related to strengthening, flexibility, endurance, ROM performed to prevent loss of range of motion, maintain or improve muscular strength or increase flexibility, following either an injury or surgery.  (15674) NEUROMUSCULAR RE-EDUCATION - Therapeutic procedure, 1 or more areas, each 15 minutes; neuromuscular reeducation of movement, balance, coordination, kinesthetic sense, posture, and/or proprioception for sitting and/or standing activities  (70305) HOME EXERCISE PROGRAM - Reviewed/Progressed HEP

## 2024-04-04 ENCOUNTER — OFFICE VISIT (OUTPATIENT)
Dept: INTERNAL MEDICINE CLINIC | Age: 44
End: 2024-04-04
Payer: COMMERCIAL

## 2024-04-04 VITALS
BODY MASS INDEX: 23.43 KG/M2 | WEIGHT: 168 LBS | HEART RATE: 89 BPM | SYSTOLIC BLOOD PRESSURE: 120 MMHG | OXYGEN SATURATION: 99 % | DIASTOLIC BLOOD PRESSURE: 80 MMHG

## 2024-04-04 DIAGNOSIS — Z02.9 ENCOUNTERS FOR ADMINISTRATIVE PURPOSE: ICD-10-CM

## 2024-04-04 DIAGNOSIS — F17.210 SMOKES CIGARETTES: ICD-10-CM

## 2024-04-04 DIAGNOSIS — K22.4 ESOPHAGEAL DYSMOTILITY: ICD-10-CM

## 2024-04-04 DIAGNOSIS — R77.8 ABNORMAL SERUM PROTEIN ELECTROPHORESIS: ICD-10-CM

## 2024-04-04 DIAGNOSIS — G43.009 MIGRAINE WITHOUT AURA AND WITHOUT STATUS MIGRAINOSUS, NOT INTRACTABLE: ICD-10-CM

## 2024-04-04 DIAGNOSIS — R73.03 PREDIABETES: Primary | ICD-10-CM

## 2024-04-04 DIAGNOSIS — E55.9 VITAMIN D DEFICIENCY: ICD-10-CM

## 2024-04-04 DIAGNOSIS — J43.8 OTHER EMPHYSEMA (HCC): ICD-10-CM

## 2024-04-04 DIAGNOSIS — M54.16 LUMBAR RADICULOPATHY: ICD-10-CM

## 2024-04-04 PROCEDURE — 99214 OFFICE O/P EST MOD 30 MIN: CPT | Performed by: INTERNAL MEDICINE

## 2024-04-04 PROCEDURE — G8427 DOCREV CUR MEDS BY ELIG CLIN: HCPCS | Performed by: INTERNAL MEDICINE

## 2024-04-04 PROCEDURE — G8420 CALC BMI NORM PARAMETERS: HCPCS | Performed by: INTERNAL MEDICINE

## 2024-04-04 PROCEDURE — 3023F SPIROM DOC REV: CPT | Performed by: INTERNAL MEDICINE

## 2024-04-04 PROCEDURE — 4004F PT TOBACCO SCREEN RCVD TLK: CPT | Performed by: INTERNAL MEDICINE

## 2024-04-04 RX ORDER — M-VIT,TX,IRON,MINS/CALC/FOLIC 27MG-0.4MG
1 TABLET ORAL DAILY
Qty: 30 TABLET | Refills: 3 | Status: SHIPPED | OUTPATIENT
Start: 2024-04-04 | End: 2025-04-04

## 2024-04-04 RX ORDER — GABAPENTIN 600 MG/1
600 TABLET ORAL 3 TIMES DAILY
COMMUNITY

## 2024-04-04 RX ORDER — TIOTROPIUM BROMIDE 18 UG/1
18 CAPSULE ORAL; RESPIRATORY (INHALATION) DAILY
Qty: 30 CAPSULE | Refills: 3 | Status: SHIPPED | OUTPATIENT
Start: 2024-04-04

## 2024-04-04 RX ORDER — ACETAMINOPHEN 160 MG
2000 TABLET,DISINTEGRATING ORAL DAILY
Qty: 90 CAPSULE | Refills: 0 | Status: SHIPPED | OUTPATIENT
Start: 2024-04-04

## 2024-04-04 RX ORDER — FAMOTIDINE 20 MG/1
20 TABLET, FILM COATED ORAL 2 TIMES DAILY
Qty: 60 TABLET | Refills: 3 | Status: SHIPPED | OUTPATIENT
Start: 2024-04-04

## 2024-04-04 ASSESSMENT — PATIENT HEALTH QUESTIONNAIRE - PHQ9
SUM OF ALL RESPONSES TO PHQ QUESTIONS 1-9: 4
4. FEELING TIRED OR HAVING LITTLE ENERGY: NOT AT ALL
SUM OF ALL RESPONSES TO PHQ QUESTIONS 1-9: 4
SUM OF ALL RESPONSES TO PHQ QUESTIONS 1-9: 4
7. TROUBLE CONCENTRATING ON THINGS, SUCH AS READING THE NEWSPAPER OR WATCHING TELEVISION: NOT AT ALL
2. FEELING DOWN, DEPRESSED OR HOPELESS: MORE THAN HALF THE DAYS
6. FEELING BAD ABOUT YOURSELF - OR THAT YOU ARE A FAILURE OR HAVE LET YOURSELF OR YOUR FAMILY DOWN: NOT AT ALL
SUM OF ALL RESPONSES TO PHQ9 QUESTIONS 1 & 2: 4
SUM OF ALL RESPONSES TO PHQ QUESTIONS 1-9: 4
9. THOUGHTS THAT YOU WOULD BE BETTER OFF DEAD, OR OF HURTING YOURSELF: NOT AT ALL
8. MOVING OR SPEAKING SO SLOWLY THAT OTHER PEOPLE COULD HAVE NOTICED. OR THE OPPOSITE, BEING SO FIGETY OR RESTLESS THAT YOU HAVE BEEN MOVING AROUND A LOT MORE THAN USUAL: NOT AT ALL
5. POOR APPETITE OR OVEREATING: NOT AT ALL
3. TROUBLE FALLING OR STAYING ASLEEP: NOT AT ALL
1. LITTLE INTEREST OR PLEASURE IN DOING THINGS: MORE THAN HALF THE DAYS

## 2024-04-04 NOTE — PROGRESS NOTES
MD Felicita   erythromycin (ROMYCIN) 5 MG/GM ophthalmic ointment Apply to eye Yes Provider, MD Bertha         ROS: COMPREHENSIVE ROS AS IN HX, REST -VE  History obtained from chart review and the patient       OBJECTIVE:   NURSING NOTE AND VITALS REVIEWED  /80   Pulse 89   Wt 76.2 kg (168 lb)   SpO2 99%   BMI 23.43 kg/m²     NO ACUTE DISTRESS    REPEAT BP: 120/80 (LT), NO ORTHOSTASIS     Body mass index is 23.43 kg/m².     HEENT: NO PALLOR, ANICTERIC, PERRLA, EOMI, NO CONJUNCTIVAL ERYTHEMA,                 NO SINUS TENDERNESS  NECK:  SUPPLE, TRACHEA MIDLINE, NT, NO JVD, NO CB, NO LA, NO TM, NO STIFFNESS  CHEST: RESPY EFFORT NL, GOOD AE, NO W/R/C  HEART: S1S2+ REG, NO M/G/R  ABD: SOFT, NT, NO HSM, BS+  EXT: NO EDEMA, NT, PULSES +. SLOANE'S -VE  NEURO: ALERT AND ORIENTED X 3, NO MENINGEAL SIGNS, NO TREMORS, UNSTEADY GAIT - AMBULATING WITH A CANE - + LIMP OTHERWISE, NO NEW FOCAL DEFICITS   PSYCH: FAIRLY GOOD AFFECT  BACK: NT, NO ROM, NO CVA TENDERNESS     PREVIOUS LABS REVIEWED AND D/W PT    ASSESSMENT / PLAN:     Diagnosis Orders   1. Prediabetes  COUNSELLED. ADVISED ON DIET / EXERCISE  ADVISED RISK FACTOR MODIFICATION.  MONITOR  MAKE CHANGES AS NEEDED.       2. Other emphysema (HCC)  COUNSELLED. MONITOR ON SPIRIVA ALBUTEROL PRN. MONITOR.  ADVISED ON SMOKING CESSATION  MAKE CHANGES AS NEEDED.       3. Lumbar radiculopathy  COUNSELLED. PT ENCOURAGED  CONTINUE MGT / MONITORING PER ORTHO, NEURO   EXERCISES. ANALGESICS PRN. MONITOR.  F/U PHY. THERAPY  MAKE CHANGES AS NEEDED.        4. Vitamin D deficiency  COUNSELLED. ADVISED INCREASE VIT D TO 2000 U DAILY.  MONITOR AND MAKE CHANGES AS NEEDED.       5. Esophageal dysmotility  COUNSELLED. MONITOR ON MED  ANTIREFLUX PRECAUTIONS ADVISED.   MAKE CHANGES AS NEEDED.       6. Migraine without aura and without status migrainosus, not intractable  COUNSELLED. SYMPTOMATIC RX. MED PRN  MONITOR. MAKE CHANGES AS NEEDED.       7. Smokes cigarettes  Smoking cessation was

## 2024-04-12 ENCOUNTER — HOSPITAL ENCOUNTER (OUTPATIENT)
Dept: PHYSICAL THERAPY | Age: 44
Setting detail: THERAPIES SERIES
Discharge: HOME | End: 2024-04-12
Payer: COMMERCIAL

## 2024-04-12 PROCEDURE — 97530 THERAPEUTIC ACTIVITIES: CPT | Performed by: PHYSICAL THERAPIST

## 2024-04-12 PROCEDURE — 97110 THERAPEUTIC EXERCISES: CPT | Performed by: PHYSICAL THERAPIST

## 2024-04-12 NOTE — THERAPY RECERTIFICATION
moves into adduction. He has to circumduct to lift leg and move it forward. Toe in and adducted foot position.     Balance:  [x] WNL      [] NT       [] Dysfunction noted  Comment:     Falls Risk Assessment (30 days):   Falls Risk assessed and no intervention required.  Time Up and Go (TUG):   Not Assessed        Exercises/Interventions     Therapeutic Ex (21795)  resistance Sets/time Reps Notes/Cues/Progressions   Seated hip flexion 3# 3x10     LAQ  SAQ 3#  0# 3x10  2x10     Supine DF/ 1x10      Seated foam roll squeeze     Seated hip abduction     TP  HS stretch supine  [id number]   Supine, foot block   Glute sets      Quad sets      DF iso with PT  5x5sec     Sidelying hip abduction  3x5  VCs   Manual Intervention (29722)  TIME                                        NMR re-education (01854) resistance Sets/time Reps CUES NEEDED   FWD step on to step Gait training Balance stabilometer ECC DF        Therapeutic Activity (63395)  Sets/time     SLR flexion Bridge  with foam roll 2x10 HS curl 20# 3x10 Available ROM   FWD step up Sit to stand   3x10  Chair, 2 mats     Modalities:    No modalities applied this session    Education/Home Exercise Program: Patient HEP program created electronically.  Refer to [employer] access code:    Access Code: [email or web address]  URL: [email or web address]  Date: 03/22/2024  Prepared by: Alma Medina    Exercises  - Supine Quad Set  - 1 x daily - 7 x weekly - 1 sets - 10 reps - 5 seconds hold  - Seated March  - 1 x daily - 7 x weekly - 3 sets - 10 reps  - Seated Long Arc Quad  - 1 x daily - 7 x weekly - 3 sets - 10 reps  - Seated Hip Adduction Isometrics with Ball  - 1 x daily - 7 x weekly - 1 sets - 10 reps - 5 seconds hold  - Seated Hip Abduction with Resistance  - 1 x daily - 7 x weekly - 3 sets - 10 reps  - Long Sitting Calf Stretch with Strap  - 1 x daily - 7 x weekly - 3-5 sets - 30 sec hold  - Sidelying Hip Abduction  - 1 x daily - 7 x weekly - 1-2 sets - 10 reps  - Supine Bridge  -

## 2024-04-18 ENCOUNTER — HOSPITAL ENCOUNTER (OUTPATIENT)
Dept: PHYSICAL THERAPY | Age: 44
Setting detail: THERAPIES SERIES
Discharge: HOME | End: 2024-04-18
Payer: COMMERCIAL

## 2024-04-18 PROCEDURE — 97110 THERAPEUTIC EXERCISES: CPT | Performed by: PHYSICAL THERAPIST

## 2024-04-18 PROCEDURE — 97530 THERAPEUTIC ACTIVITIES: CPT | Performed by: PHYSICAL THERAPIST

## 2024-04-18 NOTE — FLOWSHEET NOTE
[facility]- Outpatient Rehabilitation and Therapy [address], [place] [address] office: [phone] fax: [phone]      Physical Therapy: TREATMENT/PROGRESS NOTE   Patient: Juliocesar Freire (43 y.o. male)   Examination Date: 2024   :  1980 MRN: [id number]   Visit #: 8   Insurance Allowable Auth Needed   30 []Yes    []No    [facility] [place] / Product Type: *No Product type* /   Insurance ID: [id number] - (Medicaid Managed)  Secondary Insurance (if applicable):    Treatment Diagnosis:    No diagnosis found.     Medical Diagnosis:  Radiculopathy, lumbosacral region [M54.17]   Referring Physician: Patrick Conde MD  PCP: Felicita Brooks MD       Plan of care signed (Y/N):     Date of Patient follow up with Physician:      POC update due: (10 visits /OR AUTH LIMITS, whichever is less)   2024                                             Precautions/ Contra-indications:           Latex allergy:  NO  Pacemaker:    NO  Contraindications for Manipulation: None and NA  Date of Surgery:    Other:    Red Flags:  None    C-SSRS Triggered by Intake questionnaire:   [x] No, Questionnaire did not trigger screening.   [] Yes, Patient intake triggered further evaluation      [] C-SSRS Screening completed  [] PCP notified via Plan of Care  [] Emergency services notified     Preferred Language for Healthcare:   [x] English       [] other:    SUBJECTIVE EXAMINATION     Patient stated complaint: Pt presents to therapy with the AFO today. This is helping him ambulate with greater ease.        Test used Initial score  2024   Pain Summary VAS 7/10 6/10   Functional questionnaire Modified Oswestry 62%    Other:              Pain:  Pain location: left leg and low back  Patient describes pain to be constant and intermittent  Pain decreases with:     Pain increases with: Standing, Walking, Sitting, and [place] movements, self care, stairs,

## 2024-04-26 ENCOUNTER — HOSPITAL ENCOUNTER (OUTPATIENT)
Dept: PHYSICAL THERAPY | Age: 44
Setting detail: THERAPIES SERIES
Discharge: HOME | End: 2024-04-26
Payer: COMMERCIAL

## 2024-04-26 PROCEDURE — 97530 THERAPEUTIC ACTIVITIES: CPT | Performed by: PHYSICAL THERAPIST

## 2024-04-26 PROCEDURE — 97110 THERAPEUTIC EXERCISES: CPT | Performed by: PHYSICAL THERAPIST

## 2024-04-26 NOTE — FLOWSHEET NOTE
sense, posture, and/or proprioception for sitting and/or standing activities  (56902) HOME EXERCISE PROGRAM - Reviewed/Progressed HEP activities related to neuromuscular reeducation of movement, balance, coordination, kinesthetic sense, posture, and/or proprioception for sitting and/or standing activities    (52467) THERAPEUTIC ACTIVITY - use of dynamic activities to improve functional performance. (Ex include squatting, ascending/descending stairs, walking, bending, lifting, catching, throwing, pushing, pulling, jumping.)  Direct, one on one contact, billed in 15-minute increments.  (61882) GAIT RE-EDUCATION - Provided training and instruction to the patient for proper joint and muscle recruitment and positioning and eccentric body weight control with ambulation re-education including up and down stairs. Therapeutic procedure, one or more areas, each 15 minutes;       GOALS     Patient stated goal: everyday living.   Status:  [] Progressing: [] Met: [] Not Met: [] Adjusted    Therapist goals for Patient:   Short Term Goals: To be achieved in: 2 weeks  Independent in HEP and progression per patient tolerance, in order to progress toward full function and prevent re-injury.    Status: [x] Progressing: [] Met: [] Not Met: [] Adjusted  Patient will have a decrease in pain to 2/10 to help facilitate improvement in movement, function, and ADLs as indicated by functional deficits.   Status: [] Progressing: [] Met: [x] Not Met: [] Adjusted    Long Term Goals: To be achieved in: 8-10 weeks  Disability index score of 25% or less for the Modified Oswestry to assist with return top prior level of function.   Status: [] Progressing: [] Met: [x] Not Met: [] Adjusted  LLE AROM = RLE AROM to allow for proper joint functioning as indicated by patients functional deficits.  Status: [] Progressing: [] Met: [x] Not Met: [] Adjusted  Pt to improve strength to WFL of proximal hip, TrA/abdominal, posterior chain LE, quadriceps,

## 2024-05-03 ENCOUNTER — TELEPHONE (OUTPATIENT)
Dept: INTERNAL MEDICINE CLINIC | Age: 44
End: 2024-05-03

## 2024-05-03 ENCOUNTER — HOSPITAL ENCOUNTER (OUTPATIENT)
Dept: PHYSICAL THERAPY | Age: 44
Setting detail: THERAPIES SERIES
Discharge: HOME | End: 2024-05-03
Payer: COMMERCIAL

## 2024-05-03 PROCEDURE — 97530 THERAPEUTIC ACTIVITIES: CPT | Performed by: PHYSICAL THERAPIST

## 2024-05-03 PROCEDURE — 97110 THERAPEUTIC EXERCISES: CPT | Performed by: PHYSICAL THERAPIST

## 2024-05-03 NOTE — TELEPHONE ENCOUNTER
Patient say that he was diagnosed with MS and just wanted you to know.  He also states that on last night went his legs gave out on him, like being paralyzed and fell between 2 cars.  He was around family members that help him up and got him in the house.  He says that this morning he is feeling better and his legs are working.  ANAMARIA

## 2024-05-03 NOTE — FLOWSHEET NOTE
[facility]- Outpatient Rehabilitation and Therapy [address], [place] [address] office: [phone] fax: [phone]      Physical Therapy: TREATMENT/PROGRESS NOTE   Patient: Juliocesar Freire (43 y.o. male)   Examination Date: 2024   :  1980 MRN: [id number]   Visit #: 10   Insurance Allowable Auth Needed   30 []Yes    []No    Ins[facility]: [facility] / [facility] [place] [facility] / Product Type: *No Product type* /   Insurance ID: [id number] - (Medicaid Managed)  Secondary Insurance (if applicable):    Treatment Diagnosis:    No diagnosis found.     Medical Diagnosis:  Radiculopathy, lumbosacral region [M54.17]   Referring Physician: Patrick Conde MD  PCP: Felicita Brooks MD       Plan of care signed (Y/N):     Date of Patient follow up with Physician:      POC update due: (10 visits /OR AUTH LIMITS, whichever is less)   2024                                             Precautions/ Contra-indications:           Latex allergy:  NO  Pacemaker:    NO  Contraindications for Manipulation: None and NA  Date of Surgery:    Other:    Red Flags:  None    C-SSRS Triggered by Intake questionnaire:   [x] No, Questionnaire did not trigger screening.   [] Yes, Patient intake triggered further evaluation      [] C-SSRS Screening completed  [] PCP notified via Plan of Care  [] Emergency services notified     Preferred Language for Healthcare:   [x] English       [] other:    SUBJECTIVE EXAMINATION     Patient stated complaint: Pt reports that he was standing outside and his legs gave way. He fell to the ground and needed help to get up. Patient is also concerned with his test results.        Test used Initial score  2024   Pain Summary VAS 7/10 6/10   Functional questionnaire Modified Oswestry 62%    Other:              Pain:  Pain location: left leg and low back  Patient describes pain to be constant and intermittent  Pain decreases with:     Pain increases

## 2024-05-04 LAB
ESTIMATED AVERAGE GLUCOSE: NORMAL
HBA1C MFR BLD: 5.4 %

## 2024-05-17 ENCOUNTER — APPOINTMENT (OUTPATIENT)
Dept: PHYSICAL THERAPY | Age: 44
End: 2024-05-17
Payer: COMMERCIAL

## 2024-05-21 ENCOUNTER — APPOINTMENT (OUTPATIENT)
Dept: PHYSICAL THERAPY | Age: 44
End: 2024-05-21
Payer: COMMERCIAL

## 2024-05-29 ENCOUNTER — OFFICE VISIT (OUTPATIENT)
Dept: INTERNAL MEDICINE CLINIC | Age: 44
End: 2024-05-29

## 2024-05-29 VITALS
BODY MASS INDEX: 25.83 KG/M2 | OXYGEN SATURATION: 97 % | WEIGHT: 185.2 LBS | DIASTOLIC BLOOD PRESSURE: 70 MMHG | SYSTOLIC BLOOD PRESSURE: 110 MMHG | HEART RATE: 92 BPM

## 2024-05-29 DIAGNOSIS — G43.009 MIGRAINE WITHOUT AURA AND WITHOUT STATUS MIGRAINOSUS, NOT INTRACTABLE: ICD-10-CM

## 2024-05-29 DIAGNOSIS — R73.03 PREDIABETES: ICD-10-CM

## 2024-05-29 DIAGNOSIS — Z09 HOSPITAL DISCHARGE FOLLOW-UP: Primary | ICD-10-CM

## 2024-05-29 DIAGNOSIS — E55.9 VITAMIN D DEFICIENCY: ICD-10-CM

## 2024-05-29 DIAGNOSIS — G47.09 OTHER INSOMNIA: ICD-10-CM

## 2024-05-29 DIAGNOSIS — F32.89 OTHER DEPRESSION: ICD-10-CM

## 2024-05-29 DIAGNOSIS — G35 MS (MULTIPLE SCLEROSIS) (HCC): ICD-10-CM

## 2024-05-29 DIAGNOSIS — F17.210 SMOKES CIGARETTES: ICD-10-CM

## 2024-05-29 DIAGNOSIS — J43.8 OTHER EMPHYSEMA (HCC): ICD-10-CM

## 2024-05-29 RX ORDER — ERGOCALCIFEROL 1.25 MG/1
50000 CAPSULE ORAL WEEKLY
Qty: 12 CAPSULE | Refills: 1 | Status: SHIPPED | OUTPATIENT
Start: 2024-05-29

## 2024-05-29 RX ORDER — LANOLIN ALCOHOL/MO/W.PET/CERES
9 CREAM (GRAM) TOPICAL NIGHTLY PRN
COMMUNITY

## 2024-05-29 RX ORDER — M-VIT,TX,IRON,MINS/CALC/FOLIC 27MG-0.4MG
1 TABLET ORAL DAILY
Qty: 30 TABLET | Refills: 3 | Status: SHIPPED | OUTPATIENT
Start: 2024-05-29 | End: 2025-05-29

## 2024-05-29 RX ORDER — ERGOCALCIFEROL 1.25 MG/1
50000 CAPSULE ORAL WEEKLY
COMMUNITY
End: 2024-05-29 | Stop reason: SDUPTHER

## 2024-05-29 NOTE — PROGRESS NOTES
tablet Take 1 tablet by mouth 3 times daily.      famotidine (PEPCID) 20 MG tablet Take 1 tablet by mouth 2 times daily 60 tablet 3    Multiple Vitamins-Minerals (THERAPEUTIC MULTIVITAMIN-MINERALS) tablet Take 1 tablet by mouth daily 30 tablet 3    tiotropium (SPIRIVA HANDIHALER) 18 MCG inhalation capsule Inhale 1 capsule into the lungs daily 30 capsule 3    ibuprofen (ADVIL;MOTRIN) 800 MG tablet Take 1 tablet by mouth every 8 hours as needed for Pain 60 tablet 0    erythromycin (ROMYCIN) 5 MG/GM ophthalmic ointment Apply to eye          Medications patient taking as of now reconciled against medications ordered at time of hospital discharge: Yes      ROS: COMPREHENSIVE ROS AS IN HX, REST -VE  History obtained from chart review and the patient       OBJECTIVE:   NURSING NOTE AND VITALS REVIEWED  /74   Pulse 92   Wt 84 kg (185 lb 3.2 oz)   SpO2 97%   BMI 25.83 kg/m²     NO ACUTE DISTRESS    REPEAT BP: 110/70 (RT), NO ORTHOSTASIS     Body mass index is 25.83 kg/m².     HEENT: NO PALLOR, ANICTERIC, PERRLA, EOMI, NO CONJUNCTIVAL ERYTHEMA,                 NO SINUS TENDERNESS  NECK:  SUPPLE, TRACHEA MIDLINE, NT, NO JVD, NO CB, NO LA, NO TM, NO STIFFNESS  CHEST: RESPY EFFORT NL, GOOD AE, NO W/R/C  HEART: S1S2+ REG, NO M/G/R  ABD: SOFT, NT, NO HSM, BS+  EXT: NO EDEMA, NT, PULSES +. SLOANE'S -VE  NEURO: ALERT AND ORIENTED X 3, NO MENINGEAL SIGNS, NO TREMORS,  AMBULATING WITH A WALKER - + LIMP, MOTOR 4/5 - RLE, 2/5 - LLE OTHERWISE  PSYCH: FAIRLY GOOD AFFECT  BACK: NT, NO ROM, NO CVA TENDERNESS     PREVIOUS LABS REVIEWED AND D/W PT    ASSESSMENT / PLAN:     Diagnosis Orders   1. Hospital discharge follow-up  COUNSELLED. HOSPITAL COURSE REVIEWED AND D/W PT  SEE BELOW  DC DISCHARGE MEDS RECONCILED W/ CURRENT OUTPATIENT MED LIST       2. MS (multiple sclerosis) (HCC)  COUNSELLED. PT ENCOURAGED  MONITOR ON MGT  PT ENCOURAGED  COMPLETE PHY. THERAPY  F/U NEUROLOGY  DC DISCHARGE MEDS RECONCILED W/ CURRENT OUTPATIENT MED

## 2024-05-29 NOTE — PATIENT INSTRUCTIONS
TAKE MED AS ADVISED    DIET/ EXERCISE.    FOLLOW UP WITHIN 3 MONTHS / AS NEEDED    FOLLOW UP WITH NEUROLOGY

## 2024-05-31 ENCOUNTER — HOSPITAL ENCOUNTER (OUTPATIENT)
Dept: PHYSICAL THERAPY | Age: 44
Setting detail: THERAPIES SERIES
Discharge: HOME | End: 2024-05-31
Payer: COMMERCIAL

## 2024-05-31 PROCEDURE — 97530 THERAPEUTIC ACTIVITIES: CPT | Performed by: PHYSICAL THERAPIST

## 2024-05-31 PROCEDURE — 97112 NEUROMUSCULAR REEDUCATION: CPT | Performed by: PHYSICAL THERAPIST

## 2024-05-31 PROCEDURE — 97110 THERAPEUTIC EXERCISES: CPT | Performed by: PHYSICAL THERAPIST

## 2024-05-31 NOTE — FLOWSHEET NOTE
3-5 sets - 30 sec hold  - Sidelying Hip Abduction  - 1 x daily - 7 x weekly - 1-2 sets - 10 reps  - Supine Bridge  - 1 x daily - 7 x weekly - 1-2 sets - 10 reps  - Long Sitting Ankle Pumps  - 1 x daily - 7 x weekly - 1 sets - 10 reps  - Prone Quadriceps Stretch with Strap (Mirrored)  - 1 x daily - 7 x weekly - 5 sets - 10 sec hold    ASSESSMENT   Assessment: see below       Functional Impairments:   Decreased LE functional ROM  Decreased core/proximal hip strength and neuromuscular control  Decreased LE functional strength   Reduced balance/proprioceptive control    Functional Activity Limitations (from functional questionnaire and intake):  Reduced ability to tolerate prolonged functional positions  Reduced ability or difficulty with changes of positions or transfers between positions  Reduced ability to perform lifting, reaching, carrying tasks  Reduced ability to ambulate prolonged functional periods/distances/surfaces  Reduced ability to ascend/descend stairs  difficulty with self care    Participation Restrictions:   Reduced participation in self care  Reduced participation in home management   Reduced participation in social activities    Classification :   Left Foot drop and low back pain        Barriers to/and or personal factors that will affect rehab potential:   None noted  Co-morbidities    Physical Therapy Evaluation Complexity Justification  [x] A history of present problem and no personal factors that impact the plan of care  [x] A total of 3 elements found upon examination of body systems using standardized tests and measures addressing any of the following: body structures, functions (impairments), activity limitations, and/or participation restrictions  [x] A clinical presentation with stable and/or uncomplicated characteristics   [x] Clinical decision making of LOW (13969 - Typically 20 minutes face-to-face) complexity using standardized patient assessment instrument and/or measurable assessment

## 2024-06-03 ENCOUNTER — OFFICE VISIT (OUTPATIENT)
Dept: INTERNAL MEDICINE CLINIC | Age: 44
End: 2024-06-03
Payer: COMMERCIAL

## 2024-06-03 ENCOUNTER — TELEPHONE (OUTPATIENT)
Dept: INTERNAL MEDICINE CLINIC | Age: 44
End: 2024-06-03

## 2024-06-03 VITALS
BODY MASS INDEX: 25.8 KG/M2 | TEMPERATURE: 97.9 F | WEIGHT: 185 LBS | HEART RATE: 85 BPM | SYSTOLIC BLOOD PRESSURE: 120 MMHG | OXYGEN SATURATION: 98 % | DIASTOLIC BLOOD PRESSURE: 78 MMHG

## 2024-06-03 DIAGNOSIS — J43.8 OTHER EMPHYSEMA (HCC): ICD-10-CM

## 2024-06-03 DIAGNOSIS — M25.569 KNEE TENDERNESS: ICD-10-CM

## 2024-06-03 DIAGNOSIS — F32.89 OTHER DEPRESSION: ICD-10-CM

## 2024-06-03 DIAGNOSIS — E55.9 VITAMIN D DEFICIENCY: ICD-10-CM

## 2024-06-03 DIAGNOSIS — F17.210 SMOKES CIGARETTES: ICD-10-CM

## 2024-06-03 DIAGNOSIS — M79.662 PAIN AND SWELLING OF LEFT LOWER LEG: Primary | ICD-10-CM

## 2024-06-03 DIAGNOSIS — M79.89 PAIN AND SWELLING OF LEFT LOWER LEG: Primary | ICD-10-CM

## 2024-06-03 DIAGNOSIS — G35 MS (MULTIPLE SCLEROSIS) (HCC): ICD-10-CM

## 2024-06-03 PROCEDURE — 4004F PT TOBACCO SCREEN RCVD TLK: CPT | Performed by: INTERNAL MEDICINE

## 2024-06-03 PROCEDURE — G8427 DOCREV CUR MEDS BY ELIG CLIN: HCPCS | Performed by: INTERNAL MEDICINE

## 2024-06-03 PROCEDURE — 3023F SPIROM DOC REV: CPT | Performed by: INTERNAL MEDICINE

## 2024-06-03 PROCEDURE — 99214 OFFICE O/P EST MOD 30 MIN: CPT | Performed by: INTERNAL MEDICINE

## 2024-06-03 PROCEDURE — G8419 CALC BMI OUT NRM PARAM NOF/U: HCPCS | Performed by: INTERNAL MEDICINE

## 2024-06-03 NOTE — PATIENT INSTRUCTIONS
TAKE MED AS ADVISED    DIET/ EXERCISE.    FOLLOW UP PREVIOUS / AS NEEDED    FOLLOW UP FOR DOPPLER, X RAY

## 2024-06-03 NOTE — PROGRESS NOTES
SUBJECTIVE:  Juliocesar Freire is a 43 y.o. male HERE FOR   Chief Complaint   Patient presents with    Leg Swelling     COUPLE DAYS      PT HERE FOR EVAL    C/O LEFT LEG SWELLING - PAST 1-2 DAYS.. ++ PAIN MOSTLY CALF. DENIES TRAUMA, + WARM TOO TOUCH, NO REDNESS. NO PRIOR HX  PROGRESSIVE MS -  FOLLOW UP WITH NEUROLOGY PT IN [facility]. THERAPY     DEPRESSION-   ON ZOLOFT. + CRYING SPELLS - IMPROVING. + INSOMNIA. DENIES SUICIDAL / NO HOMICIDAL THOUGHTS  / IDEATION  EMPHYSEMA - NO WHEEZING, NO SOB, NO INCREASED INHALER USE.    VIT D DEF - TAKING MED  -96575 U Q WEEK. PREVIOUS LAB D/W PT  + SMOKER - CESSATION READDRESSED     DENIES CP, No SOB, No PALPITATIONS, No COUGH, NO F/C  No ABD PAIN, No N/V, No DIARRHEA, No CONSTIPATION, No MELENA, No HEMATOCHEZIA.  No DYSURIA, No FREQ, No URGENCY, No HEMATURIA      PMH: REVIEWED AND UPDATED TODAY    PSH: REVIEWED AND UPDATED TODAY    SOCIAL HX: REVIEWED AND UPDATED TODAY    FAMILY HX: REVIEWED AND UPDATED TODAY    ALLERGY:  Pcn [penicillins]    MEDS: REVIEWED  Prior to Visit Medications    Medication Sig Taking? Authorizing Provider   melatonin 3 MG TABS tablet Take 3 tablets by mouth nightly as needed Yes Bertha Perez MD   Multiple Vitamins-Minerals (THERAPEUTIC MULTIVITAMIN-MINERALS) tablet Take 1 tablet by mouth daily Yes Felicita Brooks MD   vitamin D (ERGOCALCIFEROL) 1.25 MG (28254 UT) CAPS capsule Take 1 capsule by mouth once a week Yes Felicita Brooks MD   sertraline (ZOLOFT) 50 MG tablet Take 1 tablet by mouth daily Yes Felicita Brooks MD   gabapentin (NEURONTIN) 600 MG tablet Take 1 tablet by mouth 3 times daily. Yes Bertha Perez MD   famotidine (PEPCID) 20 MG tablet Take 1 tablet by mouth 2 times daily Yes Felicita Brooks MD   tiotropium (SPIRIVA HANDIHALER) 18 MCG inhalation capsule Inhale 1 capsule into the lungs daily Yes Felicita Brooks MD   ibuprofen (ADVIL;MOTRIN) 800 MG tablet Take 1 tablet by mouth every 8 hours as needed for Pain Yes

## 2024-06-03 NOTE — TELEPHONE ENCOUNTER
Pt called in regards to get a appointment   Pt has swelling in his left leg . Pt did state The leg is  swollen and warm to the touch the patient has been having this for about 2 day now     Pt had botox injection and went to physical therapy on Friday and the swelling has  got worst since  Friday .Please reach out

## 2024-06-07 ENCOUNTER — HOSPITAL ENCOUNTER (OUTPATIENT)
Dept: PHYSICAL THERAPY | Age: 44
Setting detail: THERAPIES SERIES
End: 2024-06-07
Payer: COMMERCIAL

## 2024-06-14 ENCOUNTER — HOSPITAL ENCOUNTER (OUTPATIENT)
Dept: PHYSICAL THERAPY | Age: 44
Setting detail: THERAPIES SERIES
Discharge: HOME | End: 2024-06-14
Payer: COMMERCIAL

## 2024-06-14 PROCEDURE — 97110 THERAPEUTIC EXERCISES: CPT | Performed by: PHYSICAL THERAPIST

## 2024-06-14 PROCEDURE — 97530 THERAPEUTIC ACTIVITIES: CPT | Performed by: PHYSICAL THERAPIST

## 2024-06-14 NOTE — FLOWSHEET NOTE
[facility]- Outpatient Rehabilitation and Therapy [address], [place] [address] office: [phone] fax: [phone]      Physical Therapy: TREATMENT/PROGRESS NOTE   Patient: Juliocesar Freire (43 y.o. male)   Examination Date: 2024   :  1980 MRN: [id number]   Visit #: 12   Insurance Allowable Auth Needed   30 []Yes    []No    Insurance: Payor: GISELE / Plan: GISELE / Product Type: *No Product type* /   Insurance ID: [id number] - (Commercial)  Secondary Insurance (if applicable):    Treatment Diagnosis:    No diagnosis found.     Medical Diagnosis:  Radiculopathy, lumbosacral region [M54.17]   Referring Physician: Patrick Conde MD  PCP: Felicita Brooks MD       Plan of care signed (Y/N):     Date of Patient follow up with Physician:      POC update due: (10 visits /OR AUTH LIMITS, whichever is less)   2024                                             Precautions/ Contra-indications:           Latex allergy:  NO  Pacemaker:    NO  Contraindications for Manipulation: None and NA  Date of Surgery:    Other:    Red Flags:  None    C-SSRS Triggered by Intake questionnaire:   [x] No, Questionnaire did not trigger screening.   [] Yes, Patient intake triggered further evaluation      [] C-SSRS Screening completed  [] PCP notified via Plan of Care  [] Emergency services notified     Preferred Language for Healthcare:   [x] English       [] other:    SUBJECTIVE EXAMINATION     Patient stated complaint: Pt had a significant increase in Left lower leg and knee swelling last week. He has a DVT and he is using Eliquis. Swelling has decreased slightly. He is elevating at home. Pt is wearing an AFO on the left ankle.        Test used Initial score  2024   Pain Summary VAS 7/10 0/10   Functional questionnaire Modified Oswestry 62%    Other:              Pain:  Pain location: left leg and low back  Patient describes pain to be constant and intermittent  Pain decreases

## 2024-06-21 ENCOUNTER — APPOINTMENT (OUTPATIENT)
Dept: PHYSICAL THERAPY | Age: 44
End: 2024-06-21
Payer: COMMERCIAL

## 2024-06-28 ENCOUNTER — APPOINTMENT (OUTPATIENT)
Dept: PHYSICAL THERAPY | Age: 44
End: 2024-06-28
Payer: COMMERCIAL

## 2024-07-09 ENCOUNTER — TELEPHONE (OUTPATIENT)
Dept: INTERNAL MEDICINE CLINIC | Age: 44
End: 2024-07-09

## 2024-07-09 NOTE — TELEPHONE ENCOUNTER
RAYMOND CALLING TO SEE IF WE HAVE RECEIVED THE FORMS THAT WERE SENT BACK ON 06/18 FOR PROVIDER VERIFICATION FOR PT. INFORMED THEM THAT I WILL SEND A MESSAGE BACK AND ALSO ASKED THEM TO RESEND THE DOCS.

## 2024-07-11 PROBLEM — G62.9 NEUROPATHY: Status: ACTIVE | Noted: 2023-12-14

## 2024-07-11 PROBLEM — R32 URINARY INCONTINENCE: Status: ACTIVE | Noted: 2024-07-11

## 2024-07-11 PROBLEM — J43.9 CHRONIC EMPHYSEMA SYNDROME (HCC): Status: ACTIVE | Noted: 2024-07-11

## 2024-07-11 PROBLEM — I82.402 ACUTE DEEP VEIN THROMBOSIS (DVT) OF LEFT LOWER EXTREMITY (HCC): Status: ACTIVE | Noted: 2024-06-04

## 2024-07-11 PROBLEM — M54.50 LOW BACK PAIN: Status: ACTIVE | Noted: 2023-12-11

## 2024-07-11 PROBLEM — G35: Status: ACTIVE | Noted: 2024-04-25

## 2024-07-11 PROBLEM — G83.10: Status: ACTIVE | Noted: 2024-05-26

## 2024-07-11 PROBLEM — M79.2 NEUROPATHIC PAIN: Status: ACTIVE | Noted: 2024-05-26

## 2024-07-11 PROBLEM — E55.9 VITAMIN D DEFICIENCY: Status: ACTIVE | Noted: 2024-05-15

## 2024-07-12 ENCOUNTER — HOSPITAL ENCOUNTER (OUTPATIENT)
Dept: PHYSICAL THERAPY | Age: 44
Setting detail: THERAPIES SERIES
Discharge: HOME | End: 2024-07-12
Payer: COMMERCIAL

## 2024-07-12 PROCEDURE — 97530 THERAPEUTIC ACTIVITIES: CPT | Performed by: PHYSICAL THERAPIST

## 2024-07-12 PROCEDURE — 97110 THERAPEUTIC EXERCISES: CPT | Performed by: PHYSICAL THERAPIST

## 2024-07-12 NOTE — FLOWSHEET NOTE
[facility]- Outpatient Rehabilitation and Therapy [address], [place] [address] office: [phone] fax: [phone]      Physical Therapy: TREATMENT/PROGRESS NOTE   Patient: Juliocesar Freire (43 y.o. male)   Examination Date: 2024   :  1980 MRN: [id number]   Visit #: 13   Insurance Allowable Auth Needed   30 []Yes    []No    Insurance: Payor: GISELE / Plan: GISELE / Product Type: *No Product type* /   Insurance ID: [id number] - (Commercial)  Secondary Insurance (if applicable):    Treatment Diagnosis:    No diagnosis found.     Medical Diagnosis:  Radiculopathy, lumbosacral region [M54.17]   Referring Physician: Patrick Conde MD  PCP: Felicita Brooks MD       Plan of care signed (Y/N):     Date of Patient follow up with Physician:      POC update due: (10 visits /OR AUTH LIMITS, whichever is less)   2024                                             Precautions/ Contra-indications:           Latex allergy:  NO  Pacemaker:    NO  Contraindications for Manipulation: None and NA  Date of Surgery:    Other:    Red Flags:  None    C-SSRS Triggered by Intake questionnaire:   [x] No, Questionnaire did not trigger screening.   [] Yes, Patient intake triggered further evaluation      [] C-SSRS Screening completed  [] PCP notified via Plan of Care  [] Emergency services notified     Preferred Language for Healthcare:   [x] English       [] other:    SUBJECTIVE EXAMINATION     Patient stated complaint: Pt had a significant increase in Left lower leg and knee swelling last week. He has a DVT and he is using Eliquis. Swelling has decreased slightly. He is elevating at home. Pt is wearing an AFO on the left ankle.        Test used Initial score  2024   Pain Summary VAS 7/10 0/10   Functional questionnaire Modified Oswestry 62%    Other:              Pain:  Pain location: left leg and low back  Patient describes pain to be constant and intermittent  Pain decreases

## 2024-07-18 ENCOUNTER — TELEPHONE (OUTPATIENT)
Dept: INTERNAL MEDICINE CLINIC | Age: 44
End: 2024-07-18

## 2024-07-18 NOTE — TELEPHONE ENCOUNTER
Patient called about forms form [facility] that need to be signed by physician to receive homecare please advise patient came in to be seen on June the 3rd. Please advise.

## 2024-07-19 ENCOUNTER — HOSPITAL ENCOUNTER (OUTPATIENT)
Dept: PHYSICAL THERAPY | Age: 44
Setting detail: THERAPIES SERIES
Discharge: HOME | End: 2024-07-19
Payer: COMMERCIAL

## 2024-07-19 PROCEDURE — 97110 THERAPEUTIC EXERCISES: CPT | Performed by: PHYSICAL THERAPIST

## 2024-07-19 PROCEDURE — 97530 THERAPEUTIC ACTIVITIES: CPT | Performed by: PHYSICAL THERAPIST

## 2024-07-19 NOTE — THERAPY RECERTIFICATION
[facility]- Outpatient Rehabilitation and Therapy [address], [place] [address] office: [phone] fax: [phone]    Physical Therapy Re-Certification Plan of Care    Dear Patrick Conde MD  ,    We had the pleasure of treating the following patient for physical therapy services at [facility] Outpatient Physical Therapy. A summary of our findings can be found in the updated assessment below.  This includes our plan of care.  If you have any questions or concerns regarding these findings, please do not hesitate to contact me at the office phone number checked above.  Thank you for the referral.     Physician Signature:________________________________Date:__________________  By signing above (or electronic signature), therapist's plan is approved by physician      Functional Outcome: ***  Juliocesar Freire 1980 continues to present with functional deficits in {flx deficits:43171}  limiting ability with {FXL ability:85498} .  During therapy this date, patient required {cue progresssion:20438} for {ex justification:54002}. Patient will continue to benefit from ongoing evaluation and advanced clinical decision from a Physical Therapist to improve {ROM/strength/NMR/gait:54762} to safely return to {functional return:28657::\"PLOF\"} without symptoms or restrictions.    Overall Response to Treatment:  {TxResponse:48926}    Total Visits: 14     Recommendation:    [] Continue PT ***x / wk for *** weeks.   [] Hold PT, pending MD visit   [] Discharge to [facility]. Follow up with PT or MD PRN.      Physical Therapy: TREATMENT/PROGRESS NOTE   Patient: Juliocesar Freire (43 y.o. male)   Examination Date: 2024   :  1980 MRN: [id number]   Visit #: 14   Insurance Allowable Auth Needed   30 []Yes    []No    Insurance: Payor: GISELE / Plan: GISELE / Product Type: *No Product type* /   Insurance ID: [id number] - (Commercial)  Secondary Insurance (if applicable):    Treatment Diagnosis:    No

## 2024-07-26 ENCOUNTER — HOSPITAL ENCOUNTER (OUTPATIENT)
Dept: PHYSICAL THERAPY | Age: 44
Setting detail: THERAPIES SERIES
Discharge: HOME | End: 2024-07-26
Payer: COMMERCIAL

## 2024-07-26 PROCEDURE — 97530 THERAPEUTIC ACTIVITIES: CPT | Performed by: PHYSICAL THERAPIST

## 2024-07-26 PROCEDURE — 97110 THERAPEUTIC EXERCISES: CPT | Performed by: PHYSICAL THERAPIST

## 2024-08-01 ENCOUNTER — HOSPITAL ENCOUNTER (OUTPATIENT)
Dept: PHYSICAL THERAPY | Age: 44
Setting detail: THERAPIES SERIES
Discharge: HOME | End: 2024-08-01
Payer: COMMERCIAL

## 2024-08-01 PROCEDURE — 97110 THERAPEUTIC EXERCISES: CPT | Performed by: PHYSICAL THERAPIST

## 2024-08-01 PROCEDURE — 97530 THERAPEUTIC ACTIVITIES: CPT | Performed by: PHYSICAL THERAPIST

## 2024-08-07 DIAGNOSIS — J43.8 OTHER EMPHYSEMA (HCC): ICD-10-CM

## 2024-08-07 RX ORDER — TIOTROPIUM BROMIDE 18 UG/1
CAPSULE ORAL; RESPIRATORY (INHALATION)
Qty: 30 CAPSULE | Refills: 3 | Status: SHIPPED | OUTPATIENT
Start: 2024-08-07

## 2024-08-12 ENCOUNTER — TELEPHONE (OUTPATIENT)
Dept: INTERNAL MEDICINE CLINIC | Age: 44
End: 2024-08-12

## 2024-08-12 RX ORDER — M-VIT,TX,IRON,MINS/CALC/FOLIC 27MG-0.4MG
1 TABLET ORAL DAILY
Qty: 30 TABLET | Refills: 3 | Status: SHIPPED | OUTPATIENT
Start: 2024-08-12 | End: 2025-08-12

## 2024-08-15 ENCOUNTER — HOSPITAL ENCOUNTER (OUTPATIENT)
Dept: PHYSICAL THERAPY | Age: 44
Setting detail: THERAPIES SERIES
Discharge: HOME | End: 2024-08-15
Payer: COMMERCIAL

## 2024-08-15 PROCEDURE — 97110 THERAPEUTIC EXERCISES: CPT | Performed by: PHYSICAL THERAPIST

## 2024-08-15 PROCEDURE — 97530 THERAPEUTIC ACTIVITIES: CPT | Performed by: PHYSICAL THERAPIST

## 2024-08-16 DIAGNOSIS — K22.4 ESOPHAGEAL DYSMOTILITY: ICD-10-CM

## 2024-08-16 RX ORDER — FAMOTIDINE 20 MG/1
20 TABLET, FILM COATED ORAL 2 TIMES DAILY
Qty: 60 TABLET | Refills: 3 | Status: SHIPPED | OUTPATIENT
Start: 2024-08-16

## 2024-08-16 NOTE — TELEPHONE ENCOUNTER
Medication:   Requested Prescriptions     Pending Prescriptions Disp Refills    famotidine (PEPCID) 20 MG tablet 60 tablet 3     Sig: Take 1 tablet by mouth 2 times daily        Last Filled:      Patient Phone Number: [phone] (home)     Last appt: 6/3/2024   Next appt: 8/21/2024    Last OARRS:        No data to display

## 2024-08-21 ENCOUNTER — OFFICE VISIT (OUTPATIENT)
Dept: INTERNAL MEDICINE CLINIC | Age: 44
End: 2024-08-21
Payer: COMMERCIAL

## 2024-08-21 VITALS
DIASTOLIC BLOOD PRESSURE: 80 MMHG | WEIGHT: 193 LBS | SYSTOLIC BLOOD PRESSURE: 120 MMHG | HEART RATE: 74 BPM | OXYGEN SATURATION: 97 % | BODY MASS INDEX: 26.92 KG/M2 | TEMPERATURE: 97.1 F

## 2024-08-21 DIAGNOSIS — J43.8 OTHER EMPHYSEMA (HCC): ICD-10-CM

## 2024-08-21 DIAGNOSIS — F32.89 OTHER DEPRESSION: ICD-10-CM

## 2024-08-21 DIAGNOSIS — E55.9 VITAMIN D DEFICIENCY: ICD-10-CM

## 2024-08-21 DIAGNOSIS — Z59.9 HOUSING PROBLEMS: ICD-10-CM

## 2024-08-21 DIAGNOSIS — I82.492 ACUTE DEEP VEIN THROMBOSIS (DVT) OF OTHER SPECIFIED VEIN OF LEFT LOWER EXTREMITY (HCC): Primary | ICD-10-CM

## 2024-08-21 DIAGNOSIS — G47.09 OTHER INSOMNIA: ICD-10-CM

## 2024-08-21 DIAGNOSIS — F17.210 SMOKES CIGARETTES: ICD-10-CM

## 2024-08-21 DIAGNOSIS — G35 MS (MULTIPLE SCLEROSIS) (HCC): ICD-10-CM

## 2024-08-21 PROCEDURE — 4004F PT TOBACCO SCREEN RCVD TLK: CPT | Performed by: INTERNAL MEDICINE

## 2024-08-21 PROCEDURE — 99406 BEHAV CHNG SMOKING 3-10 MIN: CPT | Performed by: INTERNAL MEDICINE

## 2024-08-21 PROCEDURE — G8419 CALC BMI OUT NRM PARAM NOF/U: HCPCS | Performed by: INTERNAL MEDICINE

## 2024-08-21 PROCEDURE — 99214 OFFICE O/P EST MOD 30 MIN: CPT | Performed by: INTERNAL MEDICINE

## 2024-08-21 PROCEDURE — 3023F SPIROM DOC REV: CPT | Performed by: INTERNAL MEDICINE

## 2024-08-21 PROCEDURE — G8427 DOCREV CUR MEDS BY ELIG CLIN: HCPCS | Performed by: INTERNAL MEDICINE

## 2024-08-21 RX ORDER — NICOTINE 21 MG/24HR
1 PATCH, TRANSDERMAL 24 HOURS TRANSDERMAL DAILY
Qty: 30 PATCH | Refills: 1 | Status: SHIPPED | OUTPATIENT
Start: 2024-08-21

## 2024-08-21 RX ORDER — TIOTROPIUM BROMIDE 18 UG/1
18 CAPSULE ORAL; RESPIRATORY (INHALATION) DAILY
Qty: 30 CAPSULE | Refills: 3 | Status: SHIPPED | OUTPATIENT
Start: 2024-08-21

## 2024-08-21 RX ORDER — ERGOCALCIFEROL 1.25 MG/1
50000 CAPSULE ORAL WEEKLY
Qty: 12 CAPSULE | Refills: 1 | Status: SHIPPED | OUTPATIENT
Start: 2024-08-21

## 2024-08-21 SDOH — ECONOMIC STABILITY: INCOME INSECURITY: HOW HARD IS IT FOR YOU TO PAY FOR THE VERY BASICS LIKE FOOD, HOUSING, MEDICAL CARE, AND HEATING?: NOT HARD AT ALL

## 2024-08-21 SDOH — ECONOMIC STABILITY: FOOD INSECURITY: WITHIN THE PAST 12 MONTHS, YOU WORRIED THAT YOUR FOOD WOULD RUN OUT BEFORE YOU GOT MONEY TO BUY MORE.: NEVER TRUE

## 2024-08-21 SDOH — ECONOMIC STABILITY: FOOD INSECURITY: WITHIN THE PAST 12 MONTHS, THE FOOD YOU BOUGHT JUST DIDN'T LAST AND YOU DIDN'T HAVE MONEY TO GET MORE.: NEVER TRUE

## 2024-08-21 SDOH — ECONOMIC STABILITY - INCOME SECURITY: PROBLEM RELATED TO HOUSING AND ECONOMIC CIRCUMSTANCES, UNSPECIFIED: Z59.9

## 2024-08-21 NOTE — PROGRESS NOTES
TABS tablet Take 1 tablet by mouth 2 times daily Yes Bertha Perez MD ELIQUIS DVT/PE STARTER PACK 5 MG TBPK tablet  Yes Bertha Perez MD   gabapentin (NEURONTIN) 300 MG capsule GABAPENTIN 300 MG CAPS Yes Bertha Perez MD   diclofenac sodium (VOLTAREN) 1 % GEL Apply 4 g topically 4 times daily as needed for Pain Yes Felicita Brooks MD   melatonin 3 MG TABS tablet Take 3 tablets by mouth nightly as needed Yes Bertha Perez MD   vitamin D (ERGOCALCIFEROL) 1.25 MG (49425 UT) CAPS capsule Take 1 capsule by mouth once a week Yes Felicita Brooks MD   sertraline (ZOLOFT) 50 MG tablet Take 1 tablet by mouth daily Yes Felicita Brooks MD   gabapentin (NEURONTIN) 600 MG tablet Take 1 tablet by mouth 3 times daily. Yes Bertha Perez MD   ibuprofen (ADVIL;MOTRIN) 800 MG tablet Take 1 tablet by mouth every 8 hours as needed for Pain Yes Felicita Brooks MD   erythromycin (ROMYCIN) 5 MG/GM ophthalmic ointment Apply to eye Yes Bertha Perez MD       ROS: COMPREHENSIVE ROS AS IN HX, REST -VE  History obtained from chart review and the patient       OBJECTIVE:   NURSING NOTE AND VITALS REVIEWED  /80   Pulse 74   Temp 97.1 °F (36.2 °C)   Wt 87.5 kg (193 lb)   SpO2 97%   BMI 26.92 kg/m²     NO ACUTE DISTRESS  + NICOTINE BREATH    REPEAT BP: 120/80 (RT), NO ORTHOSTASIS     Body mass index is 26.92 kg/m².     HEENT: NO PALLOR, ANICTERIC, PERRLA, EOMI, NO CONJUNCTIVAL ERYTHEMA,                 NO SINUS TENDERNESS  NECK:  SUPPLE, TRACHEA MIDLINE, NT, NO JVD, NO CB, NO LA, NO TM, NO STIFFNESS  CHEST: RESPY EFFORT NL, GOOD AE, NO W/R/C  HEART: S1S2+ REG, NO M/G/R  ABD: SOFT, NT, NO HSM, BS+  EXT: NO EDEMA, NT, PULSES +. SLOANE'S -VE  PULSES +. SLOANE'S EQUIVOCAL - LT,  -VE -RT. NO ERYTHEMA, NO WARMTH  NEURO: ALERT AND ORIENTED X 3, NO MENINGEAL SIGNS, NO TREMORS,  AMBULATING WITH A WALKER - + LIMP, NO NEW FOCAL DEFICITS  PSYCH: FAIRLY GOOD AFFECT - LOOKS CHEERFUL  BACK: NT, NO

## 2024-08-21 NOTE — PATIENT INSTRUCTIONS
Phone:  By calling your area Job and Family Services:   [email or web address].[place].gov/about/[email or web address]-[email or web address]-[email or web address]-[email or web address]    Online:  Apply at Health Insurance Marketplace:     Mail or drop off a paper application to your local Department of .  Applications can be downloaded and printed here: https://benefits.[place].gov/   Mailing may take longer than other methods of applying.   Find your nearest local Department of  by visiting [email or web address]     Please contact your local job and family services or check online to get updates on the status of your application. Your local DJ will not contact you with updates.          [place] Health Insurance Information Program (OSHIIP)   What they offer: The department's [place] Health Insurance Information Program (OSHIIP) provides Medicare beneficiaries with free, objective health insurance information, one-on-one counseling, and educates consumers about Medicare, Medicare prescription drug coverage (Part D), Medicare Advantage options, Medicare supplement insurance.  Website:  https://www.[facility].org/about-medicare/regional-[facility]-location/[place]   Phone Number: 1-999.238.8623    [place] Department of Insurance  What they offer: Provide consumer protection through education and fair but vigilant regulation while promoting a stable and competitive environment for insurers  Website:  https://insurance.[place].gov/   Phone Number: 824.415.2511    Contact your Area Agency on Aging for information regarding waiver services and Medicaid based assistance for seniors and those with complex medical conditions.      Area Agencies on Aging (AAA)  [place] on Aging:   Counties Served: [place], [place], [place]  Address: [address], [facility] [facility] [place], [address] / Phone: [phone]  Supports Offered  [facility]  Elderly Services Program (STACEY)  [facility]   Specialized Recovery Services   Assisted

## 2024-08-23 ENCOUNTER — COMMUNITY OUTREACH (OUTPATIENT)
Dept: OTHER | Age: 44
End: 2024-08-23

## 2024-08-23 NOTE — PROGRESS NOTES
[facility]      CHW Michael Wild spoke with Mr. Freire to assess for possible needs. He cannot talk at this moment because he is on his way to vacation. CHW let him know that Alan Mclaughlin will be contacting him the first week of September.      Follow up:     CHW Alan Cornejo will follow-up the first week of September.

## 2024-08-29 ENCOUNTER — HOSPITAL ENCOUNTER (OUTPATIENT)
Dept: PHYSICAL THERAPY | Age: 44
Setting detail: THERAPIES SERIES
Discharge: HOME | End: 2024-08-29
Payer: COMMERCIAL

## 2024-08-29 PROCEDURE — 97530 THERAPEUTIC ACTIVITIES: CPT | Performed by: PHYSICAL THERAPIST

## 2024-08-29 PROCEDURE — 97110 THERAPEUTIC EXERCISES: CPT | Performed by: PHYSICAL THERAPIST

## 2024-08-29 NOTE — FLOWSHEET NOTE
[facility]- Outpatient Rehabilitation and Therapy [address], [place] [address] office: [phone] fax: [phone]      Physical Therapy: TREATMENT/PROGRESS NOTE   Patient: Juliocesar Freire (43 y.o. male)   Examination Date: 2024   :  1980 MRN: [id number]   Visit #: 18   Insurance Allowable Auth Needed   30 []Yes    []No    Insurance: Payor: GISELE / Plan: GISELE / Product Type: *No Product type* /   Insurance ID: [id number] - (Commercial)  Secondary Insurance (if applicable):    Treatment Diagnosis:    No diagnosis found.     Medical Diagnosis:  Radiculopathy, lumbosacral region [M54.17]   Referring Physician: Patrick Conde MD  PCP: Felicita Brooks MD       Plan of care signed (Y/N):     Date of Patient follow up with Physician:      POC update due: (10 visits /OR AUTH LIMITS, whichever is less)   24                                            Precautions/ Contra-indications:           Latex allergy:  NO  Pacemaker:    NO  Contraindications for Manipulation: None and NA  Date of Surgery:    Other:    Red Flags:  None    C-SSRS Triggered by Intake questionnaire:   [x] No, Questionnaire did not trigger screening.   [] Yes, Patient intake triggered further evaluation      [] C-SSRS Screening completed  [] PCP notified via Plan of Care  [] Emergency services notified     Preferred Language for Healthcare:   [x] English       [] other:    SUBJECTIVE EXAMINATION     Patient stated complaint: Pt        Test used Initial score  2024   Pain Summary VAS 7/10 0/10   Functional questionnaire Modified Oswestry 62%    Other:              Pain:  Pain location: left leg and low back  Patient describes pain to be constant and intermittent  Pain decreases with:     Pain increases with: Standing, Walking, Sitting, and [place] movements, self care, stairs, lifting     Relevant Medical History: depression, neuropathy, tobacco use,     Occupation/School:  Work/School Status:

## 2024-09-04 ENCOUNTER — TELEPHONE (OUTPATIENT)
Dept: OTHER | Age: 44
End: 2024-09-04

## 2024-09-04 NOTE — TELEPHONE ENCOUNTER
[facility]CH[facility] attempted to contact patient regarding CHW services. CHW will follow up with patient in one week.

## 2024-09-05 ENCOUNTER — HOSPITAL ENCOUNTER (OUTPATIENT)
Dept: PHYSICAL THERAPY | Age: 44
Setting detail: THERAPIES SERIES
Discharge: HOME | End: 2024-09-05
Payer: COMMERCIAL

## 2024-09-05 PROCEDURE — 97110 THERAPEUTIC EXERCISES: CPT | Performed by: PHYSICAL THERAPIST

## 2024-09-12 ENCOUNTER — HOSPITAL ENCOUNTER (OUTPATIENT)
Dept: PHYSICAL THERAPY | Age: 44
Setting detail: THERAPIES SERIES
Discharge: HOME | End: 2024-09-12
Payer: COMMERCIAL

## 2024-09-12 PROCEDURE — 97110 THERAPEUTIC EXERCISES: CPT | Performed by: PHYSICAL THERAPIST

## 2024-09-16 RX ORDER — M-VIT,TX,IRON,MINS/CALC/FOLIC 27MG-0.4MG
1 TABLET ORAL DAILY
Qty: 30 TABLET | Refills: 3 | Status: SHIPPED | OUTPATIENT
Start: 2024-09-16 | End: 2025-09-16

## 2024-09-17 ENCOUNTER — COMMUNITY OUTREACH (OUTPATIENT)
Dept: OTHER | Age: 44
End: 2024-09-17

## 2024-09-19 ENCOUNTER — HOSPITAL ENCOUNTER (OUTPATIENT)
Dept: PHYSICAL THERAPY | Age: 44
Setting detail: THERAPIES SERIES
Discharge: HOME | End: 2024-09-19
Payer: COMMERCIAL

## 2024-09-19 PROCEDURE — 97110 THERAPEUTIC EXERCISES: CPT | Performed by: PHYSICAL THERAPIST

## 2024-09-19 PROCEDURE — 97530 THERAPEUTIC ACTIVITIES: CPT | Performed by: PHYSICAL THERAPIST

## 2024-09-26 ENCOUNTER — HOSPITAL ENCOUNTER (OUTPATIENT)
Dept: PHYSICAL THERAPY | Age: 44
Setting detail: THERAPIES SERIES
Discharge: HOME | End: 2024-09-26
Payer: COMMERCIAL

## 2024-09-26 DIAGNOSIS — M79.89 PAIN AND SWELLING OF LEFT LOWER LEG: ICD-10-CM

## 2024-09-26 DIAGNOSIS — M79.662 PAIN AND SWELLING OF LEFT LOWER LEG: ICD-10-CM

## 2024-09-26 PROCEDURE — 97110 THERAPEUTIC EXERCISES: CPT | Performed by: PHYSICAL THERAPIST

## 2024-09-26 PROCEDURE — 97530 THERAPEUTIC ACTIVITIES: CPT | Performed by: PHYSICAL THERAPIST

## 2024-10-03 ENCOUNTER — HOSPITAL ENCOUNTER (OUTPATIENT)
Dept: PHYSICAL THERAPY | Age: 44
Setting detail: THERAPIES SERIES
Discharge: HOME | End: 2024-10-03
Payer: COMMERCIAL

## 2024-10-03 PROCEDURE — 97110 THERAPEUTIC EXERCISES: CPT | Performed by: PHYSICAL THERAPIST

## 2024-10-03 PROCEDURE — 97530 THERAPEUTIC ACTIVITIES: CPT | Performed by: PHYSICAL THERAPIST

## 2024-10-10 ENCOUNTER — HOSPITAL ENCOUNTER (OUTPATIENT)
Dept: PHYSICAL THERAPY | Age: 44
Setting detail: THERAPIES SERIES
Discharge: HOME | End: 2024-10-10
Payer: COMMERCIAL

## 2024-10-10 PROCEDURE — 97530 THERAPEUTIC ACTIVITIES: CPT | Performed by: PHYSICAL THERAPIST

## 2024-10-10 PROCEDURE — 97110 THERAPEUTIC EXERCISES: CPT | Performed by: PHYSICAL THERAPIST

## 2024-10-10 NOTE — THERAPY RECERTIFICATION
[facility]- Outpatient Rehabilitation and Therapy [address], [place] [address] office: [phone] fax: [phone]    Physical Therapy Re-Certification Plan of Care    Dear Patrick Conde MD  ,    We had the pleasure of treating the following patient for physical therapy services at [facility] Outpatient Physical Therapy. A summary of our findings can be found in the updated assessment below.  This includes our plan of care.  If you have any questions or concerns regarding these findings, please do not hesitate to contact me at the office phone number checked above.  Thank you for the referral.     Physician Signature:________________________________Date:__________________  By signing above (or electronic signature), therapist's plan is approved by physician      Functional Outcome: ***  Juliocesar Freire 1980 continues to present with functional deficits in {flx deficits:87362}  limiting ability with {FXL ability:90264} .  During therapy this date, patient required {cue progresssion:99043} for {ex justification:17030}. Patient will continue to benefit from ongoing evaluation and advanced clinical decision from a Physical Therapist to improve {ROM/strength/NMR/gait:43022} to safely return to {functional return:82500::\"PLOF\"} without symptoms or restrictions.    Overall Response to Treatment:  {TxResponse:49371}    Total Visits: 24     Recommendation:    [x] Continue PT 1x / wk for 6 weeks.   [] Hold PT, pending MD visit   [] Discharge to [facility]. Follow up with PT or MD PRN.      Physical Therapy: TREATMENT/PROGRESS NOTE   Patient: Juliocesar Freire (44 y.o. male)   Examination Date: 10/10/2024   :  1980 MRN: [id number]   Visit #: 24   Insurance Allowable Auth Needed   30 []Yes    []No    Insurance: Payor: GISELE / Plan: GISELE / Product Type: *No Product type* /   Insurance ID: [id number] - (Commercial)  Secondary Insurance (if applicable):    Treatment Diagnosis:    No

## 2024-10-30 RX ORDER — MULTIVITAMIN
1 TABLET ORAL DAILY
Qty: 30 TABLET | Refills: 1 | Status: SHIPPED | OUTPATIENT
Start: 2024-10-30

## 2024-10-30 NOTE — TELEPHONE ENCOUNTER
Medication:   Requested Prescriptions     Pending Prescriptions Disp Refills    Multiple Vitamin (MULTI VITAMIN DAILY) TABS [Pharmacy Med Name: MULTI VITAMIN DAILY DAILY TABLET] 30 tablet 1     Sig: TAKE ONE (1) TABLET BY MOUTH DAILY        Last Filled:      Patient Phone Number: [phone] (home)     Last appt: 8/21/2024   Next appt: 12/17/2024    Last OARRS:        No data to display

## 2024-10-31 ENCOUNTER — HOSPITAL ENCOUNTER (OUTPATIENT)
Dept: PHYSICAL THERAPY | Age: 44
Setting detail: THERAPIES SERIES
Discharge: HOME | End: 2024-10-31
Payer: COMMERCIAL

## 2024-10-31 PROCEDURE — 97110 THERAPEUTIC EXERCISES: CPT | Performed by: PHYSICAL THERAPIST

## 2024-11-11 ENCOUNTER — HOSPITAL ENCOUNTER (OUTPATIENT)
Dept: PHYSICAL THERAPY | Age: 44
Setting detail: THERAPIES SERIES
Discharge: HOME | End: 2024-11-11
Payer: COMMERCIAL

## 2024-11-11 PROCEDURE — 97530 THERAPEUTIC ACTIVITIES: CPT | Performed by: PHYSICAL THERAPIST

## 2024-11-11 PROCEDURE — 97110 THERAPEUTIC EXERCISES: CPT | Performed by: PHYSICAL THERAPIST

## 2024-11-11 NOTE — FLOWSHEET NOTE
[facility]- Outpatient Rehabilitation and Therapy [address], [place] [address] office: [phone] fax: [phone]    Physical Therapy: TREATMENT/PROGRESS NOTE   Patient: Juliocesar Freire (44 y.o. male)   Examination Date: 2024   :  1980 MRN: [id number]   Visit #: 26   Insurance Allowable Auth Needed   30 []Yes    []No    Ins[facility]: [facility] / Plan: [employer] [place] MEDICAID / Product Type: *No Product type* /   Insurance ID: [id number] - (Medicaid Managed)  Secondary Insurance (if applicable):    Treatment Diagnosis:    No diagnosis found.       Medical Diagnosis:  Radiculopathy, lumbosacral region [M54.17]   Referring Physician: Patrick Conde MD  PCP: Felicita Brooks MD       Plan of care signed (Y/N):     Date of Patient follow up with Physician:      POC update due: (10 visits /OR AUTH LIMITS, whichever is less)  11/10/24                                            Precautions/ Contra-indications:           Latex allergy:  NO  Pacemaker:    NO  Contraindications for Manipulation: None and NA  Date of Surgery:    Other:    Red Flags:  None    C-SSRS Triggered by Intake questionnaire:   [x] No, Questionnaire did not trigger screening.   [] Yes, Patient intake triggered further evaluation      [] C-SSRS Screening completed  [] PCP notified via Plan of Care  [] Emergency services notified     Preferred Language for Healthcare:   [x] English       [] other:    SUBJECTIVE EXAMINATION     Patient stated complaint: Pt is feeling a little less fatigued.        Test used Initial score  2024   Pain Summary VAS 7/10 0/10   Functional questionnaire Modified Oswestry 62%    Other:              Pain:  Pain location: left leg and low back  Patient describes pain to be constant and intermittent  Pain decreases with:     Pain increases with: Standing, Walking, Sitting, and [place] movements, self care, stairs, lifting     Relevant Medical History: depression,

## 2024-11-13 ENCOUNTER — HOSPITAL ENCOUNTER (OUTPATIENT)
Dept: OCCUPATIONAL THERAPY | Age: 44
Setting detail: THERAPIES SERIES
Discharge: HOME | End: 2024-11-13
Payer: COMMERCIAL

## 2024-11-13 PROCEDURE — 97530 THERAPEUTIC ACTIVITIES: CPT

## 2024-11-13 PROCEDURE — 97542 WHEELCHAIR MNGMENT TRAINING: CPT

## 2024-11-13 PROCEDURE — 97166 OT EVAL MOD COMPLEX 45 MIN: CPT

## 2024-11-13 NOTE — PLAN OF CARE
[] Home Management Training x              [] Other: WHEELCHAIR ASSESSMENT/TRAINING (73302) x  3    Electronically Signed By: DIANE Chavez/L, C/NDT ([id number])

## 2024-11-18 ENCOUNTER — HOSPITAL ENCOUNTER (OUTPATIENT)
Dept: PHYSICAL THERAPY | Age: 44
Setting detail: THERAPIES SERIES
Discharge: HOME | End: 2024-11-18
Payer: COMMERCIAL

## 2024-11-18 PROCEDURE — 97530 THERAPEUTIC ACTIVITIES: CPT | Performed by: PHYSICAL THERAPIST

## 2024-11-18 PROCEDURE — 97110 THERAPEUTIC EXERCISES: CPT | Performed by: PHYSICAL THERAPIST

## 2024-11-18 NOTE — FLOWSHEET NOTE
tasks  Reduced ability to ambulate prolonged functional periods/distances/surfaces  Reduced ability to ascend/descend stairs  difficulty with self care    Participation Restrictions:   Reduced participation in self care  Reduced participation in home management   Reduced participation in social activities    Classification :   Left Foot drop and low back pain        Barriers to/and or personal factors that will affect rehab potential:   None noted  Co-morbidities    Physical Therapy Evaluation Complexity Justification  [x] A history of present problem and no personal factors that impact the plan of care  [x] A total of 3 elements found upon examination of body systems using standardized tests and measures addressing any of the following: body structures, functions (impairments), activity limitations, and/or participation restrictions  [x] A clinical presentation with stable and/or uncomplicated characteristics   [x] Clinical decision making of LOW (53131 - Typically 20 minutes face-to-face) complexity using standardized patient assessment instrument and/or measurable assessment of functional outcome.    Today's Assessment:  Patient fatigues easily with 40 min of exercise. Poor left LE control and giving way sensation. Pt tolerates core exercises and upper body exercises well. Pt is dragging the left leg during ambulation, poor knee and hip AROM/LE strength.     Medical Necessity Documentation:  I certify that this patient meets the below criteria necessary for medical necessity for care and/or justification of therapy services:  The patient has a musculoskeletal condition(s) with a corresponding ICD-10 code that is of complexity and severity that require skilled therapeutic intervention. This has a direct and significant impact on the need for therapy and significantly impacts the rate of recovery.       Return to Play: NA    Prognosis for POC: [x] Good [] Fair  [] Poor    Patient requires continued skilled

## 2024-11-25 ENCOUNTER — HOSPITAL ENCOUNTER (OUTPATIENT)
Dept: PHYSICAL THERAPY | Age: 44
Setting detail: THERAPIES SERIES
Discharge: HOME | End: 2024-11-25
Payer: COMMERCIAL

## 2024-11-25 PROCEDURE — 97110 THERAPEUTIC EXERCISES: CPT | Performed by: PHYSICAL THERAPIST

## 2024-11-25 PROCEDURE — 97530 THERAPEUTIC ACTIVITIES: CPT | Performed by: PHYSICAL THERAPIST

## 2024-11-25 NOTE — FLOWSHEET NOTE
kinesthetic sense, posture, and/or proprioception for sitting and/or standing activities    (46969) THERAPEUTIC ACTIVITY - use of dynamic activities to improve functional performance. (Ex include squatting, ascending/descending stairs, walking, bending, lifting, catching, throwing, pushing, pulling, jumping.)  Direct, one on one contact, billed in 15-minute increments.  (16400) GAIT RE-EDUCATION - Provided training and instruction to the patient for proper joint and muscle recruitment and positioning and eccentric body weight control with ambulation re-education including up and down stairs. Therapeutic procedure, one or more areas, each 15 minutes;       GOALS     Patient stated goal: everyday living.   Status:  [x] Progressing: [] Met: [] Not Met: [] Adjusted    Therapist goals for Patient:   Short Term Goals: To be achieved in: 2 weeks  Independent in HEP and progression per patient tolerance, in order to progress toward full function and prevent re-injury.    Status: [x] Progressing: [] Met: [] Not Met: [] Adjusted  Patient will have a decrease in pain to 2/10 to help facilitate improvement in movement, function, and ADLs as indicated by functional deficits.   Status: [] Progressing: [] Met: [x] Not Met: [] Adjusted    Long Term Goals: To be achieved in: 8-10 weeks  Disability index score of 25% or less for the Modified Oswestry to assist with return top prior level of function.   Status: [] Progressing: [] Met: [x] Not Met: [] Adjusted  LLE AROM = RLE AROM to allow for proper joint functioning as indicated by patients functional deficits.  Status: [] Progressing: [] Met: [x] Not Met: [] Adjusted  Pt to improve strength to WFL of proximal hip, TrA/abdominal, posterior chain LE, quadriceps, gastroc/soleus, and hamstrings to allow for proper muscle and joint use in functional mobility, ADLs and prior level of function   Status: [x] Progressing: [] Met: [] Not Met: [] Adjusted  Patient will return to  Usual work,

## 2024-12-02 ENCOUNTER — HOSPITAL ENCOUNTER (OUTPATIENT)
Dept: PHYSICAL THERAPY | Age: 44
Setting detail: THERAPIES SERIES
Discharge: HOME | End: 2024-12-02
Payer: COMMERCIAL

## 2024-12-02 PROCEDURE — 97530 THERAPEUTIC ACTIVITIES: CPT | Performed by: PHYSICAL THERAPIST

## 2024-12-02 PROCEDURE — 97110 THERAPEUTIC EXERCISES: CPT | Performed by: PHYSICAL THERAPIST

## 2024-12-02 NOTE — FLOWSHEET NOTE
[facility]- Outpatient Rehabilitation and Therapy [address], [place] [address] office: [phone] fax: [phone]      Physical Therapy: TREATMENT/PROGRESS NOTE   Patient: Juliocesar Freire (44 y.o. male)   Examination Date: 2024   :  1980 MRN: [id number]   Visit #: 29   Insurance Allowable Auth Needed   30 []Yes    []No    Ins[facility]: [facility] / Plan: [employer] [place] MEDICAID / Product Type: *No Product type* /   Insurance ID: [id number] - (Medicaid Managed)  Secondary Insurance (if applicable):    Treatment Diagnosis:    No diagnosis found.       Medical Diagnosis:  Radiculopathy, lumbosacral region [M54.17]   Referring Physician: Patrick Conde MD  PCP: Felicita Brooks MD       Plan of care signed (Y/N):     Date of Patient follow up with Physician: 24     POC update due: (10 visits /OR AUTH LIMITS, whichever is less)  12/10/24                                            Precautions/ Contra-indications:           Latex allergy:  NO  Pacemaker:    NO  Contraindications for Manipulation: None and NA  Date of Surgery:    Other:    Red Flags:  None    C-SSRS Triggered by Intake questionnaire:   [x] No, Questionnaire did not trigger screening.   [] Yes, Patient intake triggered further evaluation      [] C-SSRS Screening completed  [] PCP notified via Plan of Care  [] Emergency services notified     Preferred Language for Healthcare:   [x] English       [] other:    SUBJECTIVE EXAMINATION     Patient stated complaint: Pt has no new c/o's today. He is frustrated that he did not catch this earlier and maybe it would not be so bad. \"Down on myself\"  Memories are popping up on the phone and pictures of what he used to do for work. Pt reports he has difficulty with controlling the left leg and eccentric movement I.e. sitting down with control.        Test used Initial score  2024   Pain Summary VAS 7/10 0/10   Functional questionnaire Modified

## 2024-12-09 ENCOUNTER — HOSPITAL ENCOUNTER (OUTPATIENT)
Dept: PHYSICAL THERAPY | Age: 44
Setting detail: THERAPIES SERIES
Discharge: HOME | End: 2024-12-09
Payer: COMMERCIAL

## 2024-12-09 PROCEDURE — 97530 THERAPEUTIC ACTIVITIES: CPT | Performed by: PHYSICAL THERAPIST

## 2024-12-09 PROCEDURE — 97110 THERAPEUTIC EXERCISES: CPT | Performed by: PHYSICAL THERAPIST

## 2024-12-09 NOTE — FLOWSHEET NOTE
(E11.65)   [x] Neuropathy (G60.9)        Cardiovascular conditions  [] Hypertension (I10)  [] Hyperlipidemia (E78.5)  [] Angina pectoris (I20)  [] Atherosclerosis (I70)  [] Pacemaker/Defib  [] Hx of CABG/stent/cardiac surgeries        Developmental Disorders  [] Autism (F84.0)  [] Cerebral Palsy (G80)  [] Down Syndrome (Q90.9)  [] Developmental delay     Psychological Disorders  [] Anxiety (F41.9)  [x] Depression (F32.9)   [] Bipolar  [] Other:      Prior surgeries  [] Involved limb  [] Previous spinal surgery  []  section birth  [] Hysterectomy  [] Bowel / bladder surgery  [] Other relevant surgeries        Other conditions  [] Vertigo  [] Syncope  [] Kidney Failure  [] Cancer  [] Pregnancy  [] Incontinence   Other Co-morbidities not listed: tobacco use      OBJECTIVE EXAMINATION     ROM/Strength: (Blank cells denote NT)       Mvmt (norm) ROM L ROM R Notes MMT L MMT R Notes     CERVICAL Flex (60)        Ext (70)        SB(45)          Rotation (80)                 SHOULDER Flexion (180)          Abduction (180)          ER -0          ER -90 (90)          IR -0          IR -90 (70)               ELBOW Flex/biceps (140)          Ext/triceps (0)          Pronation (80)          Supination (80)                 WRIST Flexion (60)          Extension (60)          RD (20)          UD (20)                         LUMBAR Flex (90)        Ext (25)        SB (25)          Lumbar Rot                 HIP  Flex (120)    3/5      Abd (45)    2/5      ER (50)          IR (45)          Ext (20)    3/5         KNEE Flex (140)    3/5      Ext (0)    3/5           ANKLE DF (20)    1/5      PF (50)    1/5      Inversion (30)          Eversion (20)           Posture:   forward head, rounded shoulders, and decreased WB on L  Tightness in the hip flexors. Pt stands with hip flexion and knee flexion position.     Gait:    Pattern: decreased martir, decreased step length, decreased trunk rotation, shuffles, and left foot drop

## 2024-12-14 ENCOUNTER — HOSPITAL ENCOUNTER (EMERGENCY)
Age: 44
Discharge: HOME | End: 2024-12-14
Attending: EMERGENCY MEDICINE
Payer: COMMERCIAL

## 2024-12-14 ENCOUNTER — APPOINTMENT (OUTPATIENT)
Dept: CT IMAGING | Age: 44
End: 2024-12-14
Payer: COMMERCIAL

## 2024-12-14 VITALS
DIASTOLIC BLOOD PRESSURE: 83 MMHG | WEIGHT: 202.1 LBS | HEIGHT: 71 IN | OXYGEN SATURATION: 100 % | BODY MASS INDEX: 28.29 KG/M2 | HEART RATE: 73 BPM | SYSTOLIC BLOOD PRESSURE: 143 MMHG | TEMPERATURE: 97.5 F | RESPIRATION RATE: 16 BRPM

## 2024-12-14 DIAGNOSIS — N39.0 URINARY TRACT INFECTION WITH HEMATURIA, SITE UNSPECIFIED: Primary | ICD-10-CM

## 2024-12-14 DIAGNOSIS — R31.9 URINARY TRACT INFECTION WITH HEMATURIA, SITE UNSPECIFIED: Primary | ICD-10-CM

## 2024-12-14 LAB
ALBUMIN SERPL-MCNC: 3.7 G/DL (ref 3.4–5)
ALBUMIN/GLOB SERPL: 1 {RATIO} (ref 1.1–2.2)
ALP SERPL-CCNC: 64 U/L (ref 40–129)
ALT SERPL-CCNC: ABNORMAL U/L (ref 10–40)
ANION GAP SERPL CALCULATED.3IONS-SCNC: 9 MMOL/L (ref 3–16)
AST SERPL-CCNC: 24 U/L (ref 15–37)
BACTERIA URNS QL MICRO: ABNORMAL /HPF
BASOPHILS # BLD: 0 K/UL (ref 0–0.2)
BASOPHILS NFR BLD: 0.5 %
BILIRUB SERPL-MCNC: 0.4 MG/DL (ref 0–1)
BILIRUB UR QL STRIP.AUTO: ABNORMAL
BUN SERPL-MCNC: 19 MG/DL (ref 7–20)
CALCIUM SERPL-MCNC: 8.9 MG/DL (ref 8.3–10.6)
CHLORIDE SERPL-SCNC: 103 MMOL/L (ref 99–110)
CLARITY UR: ABNORMAL
CO2 SERPL-SCNC: 25 MMOL/L (ref 21–32)
COLOR UR: ABNORMAL
CREAT SERPL-MCNC: 1.2 MG/DL (ref 0.9–1.3)
DEPRECATED RDW RBC AUTO: 15.8 % (ref 12.4–15.4)
EOSINOPHIL # BLD: 0.1 K/UL (ref 0–0.6)
EOSINOPHIL NFR BLD: 1.8 %
EPI CELLS #/AREA URNS HPF: ABNORMAL /HPF (ref 0–5)
GFR SERPLBLD CREATININE-BSD FMLA CKD-EPI: 76 ML/MIN/{1.73_M2}
GLUCOSE SERPL-MCNC: 84 MG/DL (ref 70–99)
GLUCOSE UR STRIP.AUTO-MCNC: NEGATIVE MG/DL
HCT VFR BLD AUTO: 41.6 % (ref 40.5–52.5)
HGB BLD-MCNC: 14 G/DL (ref 13.5–17.5)
HGB UR QL STRIP.AUTO: ABNORMAL
KETONES UR STRIP.AUTO-MCNC: 15 MG/DL
LEUKOCYTE ESTERASE UR QL STRIP.AUTO: ABNORMAL
LYMPHOCYTES # BLD: 2.1 K/UL (ref 1–5.1)
LYMPHOCYTES NFR BLD: 37.5 %
MCH RBC QN AUTO: 31.6 PG (ref 26–34)
MCHC RBC AUTO-ENTMCNC: 33.7 G/DL (ref 31–36)
MCV RBC AUTO: 93.8 FL (ref 80–100)
MONOCYTES # BLD: 0.2 K/UL (ref 0–1.3)
MONOCYTES NFR BLD: 3.2 %
NEUTROPHILS # BLD: 3.2 K/UL (ref 1.7–7.7)
NEUTROPHILS NFR BLD: 57 %
NITRITE UR QL STRIP.AUTO: POSITIVE
PH UR STRIP.AUTO: 7 [PH] (ref 5–8)
PLATELET # BLD AUTO: 236 K/UL (ref 135–450)
PMV BLD AUTO: 7.7 FL (ref 5–10.5)
POTASSIUM SERPL-SCNC: 3.6 MMOL/L (ref 3.5–5.1)
POTASSIUM SERPL-SCNC: ABNORMAL MMOL/L (ref 3.5–5.1)
PROT SERPL-MCNC: 7.4 G/DL (ref 6.4–8.2)
PROT UR STRIP.AUTO-MCNC: 100 MG/DL
RBC # BLD AUTO: 4.44 M/UL (ref 4.2–5.9)
RBC #/AREA URNS HPF: >100 /HPF (ref 0–4)
SODIUM SERPL-SCNC: 137 MMOL/L (ref 136–145)
SP GR UR STRIP.AUTO: 1.01 (ref 1–1.03)
UA COMPLETE W REFLEX CULTURE PNL UR: YES
UA DIPSTICK W REFLEX MICRO PNL UR: YES
URN SPEC COLLECT METH UR: ABNORMAL
UROBILINOGEN UR STRIP-ACNC: 1 E.U./DL
WBC # BLD AUTO: 5.7 K/UL (ref 4–11)
WBC #/AREA URNS HPF: ABNORMAL /HPF (ref 0–5)

## 2024-12-14 PROCEDURE — 85025 COMPLETE CBC W/AUTO DIFF WBC: CPT

## 2024-12-14 PROCEDURE — 99285 EMERGENCY DEPT VISIT HI MDM: CPT

## 2024-12-14 PROCEDURE — 6360000002 HC RX W HCPCS: Performed by: EMERGENCY MEDICINE

## 2024-12-14 PROCEDURE — 80053 COMPREHEN METABOLIC PANEL: CPT

## 2024-12-14 PROCEDURE — 87086 URINE CULTURE/COLONY COUNT: CPT

## 2024-12-14 PROCEDURE — 84132 ASSAY OF SERUM POTASSIUM: CPT

## 2024-12-14 PROCEDURE — 96374 THER/PROPH/DIAG INJ IV PUSH: CPT

## 2024-12-14 PROCEDURE — 51798 US URINE CAPACITY MEASURE: CPT

## 2024-12-14 PROCEDURE — 87186 SC STD MICRODIL/AGAR DIL: CPT

## 2024-12-14 PROCEDURE — 2580000003 HC RX 258: Performed by: EMERGENCY MEDICINE

## 2024-12-14 PROCEDURE — 81001 URINALYSIS AUTO W/SCOPE: CPT

## 2024-12-14 PROCEDURE — 74177 CT ABD & PELVIS W/CONTRAST: CPT

## 2024-12-14 PROCEDURE — 6360000004 HC RX CONTRAST MEDICATION: Performed by: EMERGENCY MEDICINE

## 2024-12-14 PROCEDURE — 87077 CULTURE AEROBIC IDENTIFY: CPT

## 2024-12-14 RX ORDER — CEFDINIR 300 MG/1
300 CAPSULE ORAL 2 TIMES DAILY
Qty: 20 CAPSULE | Refills: 0 | Status: SHIPPED | OUTPATIENT
Start: 2024-12-14 | End: 2024-12-24

## 2024-12-14 RX ORDER — IOPAMIDOL 755 MG/ML
75 INJECTION, SOLUTION INTRAVASCULAR
Status: COMPLETED | OUTPATIENT
Start: 2024-12-14 | End: 2024-12-14

## 2024-12-14 RX ADMIN — WATER 1000 MG: 1 INJECTION INTRAMUSCULAR; INTRAVENOUS; SUBCUTANEOUS at 18:13

## 2024-12-14 RX ADMIN — IOPAMIDOL 75 ML: 755 INJECTION, SOLUTION INTRAVENOUS at 17:44

## 2024-12-14 ASSESSMENT — PAIN - FUNCTIONAL ASSESSMENT: PAIN_FUNCTIONAL_ASSESSMENT: NONE - DENIES PAIN

## 2024-12-14 NOTE — ED PROVIDER NOTES
[address]  [facility] [id number] [phone]    Schedule an appointment as soon as possible for a visit       Emmett Jansen MD  [address]  [address]  [facility] [address] [phone]    Schedule an appointment as soon as possible for a visit         Discharge vitals:  Blood pressure (!) 143/83, pulse 73, temperature 97.5 °F (36.4 °C), temperature source Oral, resp. rate 16, height 1.803 m (5' 11\"), weight 91.7 kg (202 lb 1.6 oz), SpO2 100%.    Prescriptions given:   Discharge Medication List as of 12/14/2024  7:20 PM        START taking these medications    Details   cefdinir (OMNICEF) 300 MG capsule Take 1 capsule by mouth 2 times daily for 10 days, Disp-20 capsule, R-0Normal               This chart was created using dragon voice recognition software.        Faith Kelley MD  12/15/24 7096

## 2024-12-15 NOTE — DISCHARGE INSTRUCTIONS
Do not take your eliquis tonight or tomorrow. Restart Monday if blood in urine has improved. Call PCP and urology on Monday whether to start eliquis again on Monday if blood in urine is persistent. Drink plenty of fluids. Followup on urine culture. Return for fever, abdominal pain, flank pain, vomiting, inability to urinate

## 2024-12-17 ENCOUNTER — OFFICE VISIT (OUTPATIENT)
Dept: INTERNAL MEDICINE CLINIC | Age: 44
End: 2024-12-17

## 2024-12-17 VITALS
OXYGEN SATURATION: 95 % | SYSTOLIC BLOOD PRESSURE: 108 MMHG | RESPIRATION RATE: 18 BRPM | TEMPERATURE: 98 F | WEIGHT: 202 LBS | BODY MASS INDEX: 28.28 KG/M2 | HEART RATE: 62 BPM | DIASTOLIC BLOOD PRESSURE: 70 MMHG | HEIGHT: 71 IN

## 2024-12-17 DIAGNOSIS — G47.09 OTHER INSOMNIA: ICD-10-CM

## 2024-12-17 DIAGNOSIS — H61.23 IMPACTED CERUMEN, BILATERAL: ICD-10-CM

## 2024-12-17 DIAGNOSIS — G35 MS (MULTIPLE SCLEROSIS) (HCC): ICD-10-CM

## 2024-12-17 DIAGNOSIS — J43.8 OTHER EMPHYSEMA (HCC): ICD-10-CM

## 2024-12-17 DIAGNOSIS — I82.492 ACUTE DEEP VEIN THROMBOSIS (DVT) OF OTHER SPECIFIED VEIN OF LEFT LOWER EXTREMITY (HCC): ICD-10-CM

## 2024-12-17 DIAGNOSIS — R73.03 PREDIABETES: ICD-10-CM

## 2024-12-17 DIAGNOSIS — E55.9 VITAMIN D DEFICIENCY: ICD-10-CM

## 2024-12-17 DIAGNOSIS — R31.29 MICROSCOPIC HEMATURIA: ICD-10-CM

## 2024-12-17 DIAGNOSIS — F32.89 OTHER DEPRESSION: ICD-10-CM

## 2024-12-17 DIAGNOSIS — F17.210 SMOKES CIGARETTES: ICD-10-CM

## 2024-12-17 DIAGNOSIS — Z00.00 PHYSICAL EXAM: Primary | ICD-10-CM

## 2024-12-17 LAB
BACTERIA UR CULT: ABNORMAL
BACTERIA UR CULT: ABNORMAL
BILIRUBIN, POC: NEGATIVE
BLOOD URINE, POC: NORMAL
CLARITY, POC: CLEAR
COLOR, POC: YELLOW
GLUCOSE URINE, POC: NEGATIVE MG/DL
KETONES, POC: NEGATIVE MG/DL
LEUKOCYTE EST, POC: NORMAL
NITRITE, POC: NEGATIVE
ORGANISM: ABNORMAL
PH, POC: 5.5
PROTEIN, POC: NEGATIVE MG/DL
SPECIFIC GRAVITY, POC: 1.03
UROBILINOGEN, POC: 0.2 MG/DL

## 2024-12-17 RX ORDER — BACLOFEN 5 MG/1
5 TABLET ORAL 3 TIMES DAILY
COMMUNITY
Start: 2024-11-04 | End: 2025-02-02

## 2024-12-17 RX ORDER — ERGOCALCIFEROL 1.25 MG/1
50000 CAPSULE, LIQUID FILLED ORAL WEEKLY
Qty: 12 CAPSULE | Refills: 1 | Status: SHIPPED | OUTPATIENT
Start: 2024-12-17

## 2024-12-17 NOTE — PATIENT INSTRUCTIONS
TAKE MED AS ADVISED    DIET/ EXERCISE.    FOLLOW UP WITHIN 4 MONTHS / AS NEEDED    FOLLOW UP FOR FASTING LABS    [facility] Locations - No appointment necessary.  ? indicates the location is open Saturdays in addition to Monday through Friday.   Call your preferred location for test preparation, business hours and other information you need.   [facility] accepts all insurances.  CENTRAL  EAST  [place]    ? [place]   [address]   [address]   [place] [address]    Ph: [phone]  [facility]   [address] [place] [address]    Ph: [phone]   ? [place]   [address]    [place] [address]    Ph: [phone]     [facility]   [address] [place] [address]    Ph: [phone] ? [place]   [address] [place] [address]   Ph: [phone]  ? [facility]   [address] [place] [address]    Ph: [phone]      [place]   [address] [place] [address]   Ph: [phone]    NORTH    ? [facility]   [address] [place] [address]    Ph: [phone]  [facility]   [address] [place] [address]   Ph: [phone]  [place]   [address] [place], [place] [address].   [facility], [address]    PH: [phone]    [facility]   [address] [place] [address]    Ph: [phone]  [place]  [address] [place] [address]  Ph: [phone]  [facility]   [address] [place] [address]    Ph: [phone]

## 2024-12-17 NOTE — PROGRESS NOTES
SUBJECTIVE:  Juliocesar Freire is a 44 y.o. male HERE FOR   Chief Complaint   Patient presents with    Annual Exam    Other      PT HERE FOR EVAL / PHYSICAL EXAM    NEEDS PHYSICAL EXAM     EMPHYSEMA - NO WHEEZING, NO SOB, NO INCREASED INHALER USE.    ACUTE DVT - LT. NOTED ON DOPPLER 6/24. HOLDING ELIQUIS.  LEFT LEG SWELLING - IMPROVED. NO PAIN      DEPRESSION-   ON ZOLOFT - HELPING. + CRYING SPELLS - IMPROVED. + INSOMNIA. DENIES SUICIDAL / NO HOMICIDAL THOUGHTS  / IDEATION  INSOMNIA - TAKING MELATONIN - HELPING  VIT D DEF - TAKING MED  - 70487 U Q WEEK. LAB D/W PT  PROGRESSIVE MS -  FOLLOW UP WITH NEUROLOGY.  PT IN PHY. THERAPY - HELPING PER PT. MED CHANGE PER NEURO  PREDIABETES - DIET / EXERCISE REVIEWED. PREVIOUS LABS D/W PT  C/O HEMATURIA - SEEN RECENTLY IN ER. ON ANTIBIOTIC FOR UTI. ABN UA D/W PT. S/P RECENT CT  + SMOKER - CESSATION READDRESSED.       DENIES CP, No SOB, No PALPITATIONS, No COUGH, NO F/C  No ABD PAIN, No N/V, No DIARRHEA, No CONSTIPATION, No MELENA, No HEMATOCHEZIA.  No DYSURIA, No FREQ, No URGENCY, + HEMATURIA       PMH: REVIEWED AND UPDATED TODAY    PSH: REVIEWED AND UPDATED TODAY    SOCIAL HX: REVIEWED AND UPDATED TODAY    FAMILY HX: REVIEWED AND UPDATED TODAY    ALLERGY:  Pcn [penicillins]    MEDS: REVIEWED  Prior to Visit Medications    Medication Sig Taking? Authorizing Provider   Baclofen (LIORESAL) 5 MG tablet Take 1 tablet by mouth 3 times daily Yes ProviderBertha MD   cefdinir (OMNICEF) 300 MG capsule Take 1 capsule by mouth 2 times daily for 10 days Yes Faith Kelley MD   Multiple Vitamin (MULTI VITAMIN DAILY) TABS TAKE ONE (1) TABLET BY MOUTH DAILY Yes Felicita Brooks MD   Multiple Vitamins-Minerals (THERAPEUTIC MULTIVITAMIN-MINERALS) tablet Take 1 tablet by mouth daily Yes Felicita Brooks MD   sertraline (ZOLOFT) 50 MG tablet Take 1 tablet by mouth daily Yes Felicita Brooks MD   tiotropium (SPIRIVA HANDIHALER) 18 MCG inhalation capsule Inhale 1 capsule into the

## 2024-12-26 NOTE — TELEPHONE ENCOUNTER
Medication:   Requested Prescriptions     Pending Prescriptions Disp Refills    Multiple Vitamin (MULTI VITAMIN DAILY) TABS [Pharmacy Med Name: MULTI VITAMIN DAILY DAILY TABLET] 30 tablet 1     Sig: TAKE ONE (1) TABLET BY MOUTH DAILY        Last Filled:      Patient Phone Number: [phone] (home)     Last appt: 12/17/2024   Next appt: 4/17/2025    Last OARRS:        No data to display

## 2024-12-30 ENCOUNTER — HOSPITAL ENCOUNTER (OUTPATIENT)
Dept: PHYSICAL THERAPY | Age: 44
Setting detail: THERAPIES SERIES
Discharge: HOME | End: 2024-12-30
Payer: COMMERCIAL

## 2024-12-30 ENCOUNTER — TELEPHONE (OUTPATIENT)
Dept: INTERNAL MEDICINE CLINIC | Age: 44
End: 2024-12-30

## 2024-12-30 PROCEDURE — 97530 THERAPEUTIC ACTIVITIES: CPT | Performed by: PHYSICAL THERAPIST

## 2024-12-30 PROCEDURE — 97110 THERAPEUTIC EXERCISES: CPT | Performed by: PHYSICAL THERAPIST

## 2024-12-30 RX ORDER — MULTIVITAMIN
1 TABLET ORAL DAILY
Qty: 30 TABLET | Refills: 3 | Status: SHIPPED | OUTPATIENT
Start: 2024-12-30

## 2024-12-30 NOTE — PLAN OF CARE
[facility]- Outpatient Rehabilitation and Therapy [address], [place] [address] office: [phone] fax: [phone]    Physical Therapy Re-Certification Plan of Care    Dear Patrick Conde MD  ,    We had the pleasure of treating the following patient for physical therapy services at [facility]. A summary of our findings can be found in the updated assessment below.  This includes our plan of care.  If you have any questions or concerns regarding these findings, please do not hesitate to contact me at the office phone number checked above.  Thank you for the referral.     Physician Signature:________________________________Date:__________________  By signing above (or electronic signature), therapist's plan is approved by physician      Total Visits: 31     Overall Response to Treatment:  Patient has been out of therapy for approx 3 weeks. He had a UTI infection. Pt is also managing his MS diagnosis.     Recommendation:    [x] Continue PT 1x / wk for 6 weeks.   [] Hold PT, pending MD visit   [] Discharge to [facility]. Follow up with PT or MD PRN.    Physical Therapy: TREATMENT/PROGRESS NOTE   Patient: Juliocesar Freire (44 y.o. male)   Examination Date: 2024   :  1980 MRN: [id number]   Visit #: 31   Insurance Allowable Auth Needed   30 []Yes    []No    Insurance: Payor: [facility] / Plan: [facility] MEDICAID / Product Type: *No Product type* /   Insurance ID: [id number] - (Medicaid Managed)  Secondary Insurance (if applicable):    Treatment Diagnosis:    No diagnosis found.       Medical Diagnosis:  Radiculopathy, lumbosacral region [M54.17]   Referring Physician: Patrick Conde MD  PCP: Felicita Brooks MD       Plan of care signed (Y/N):     Date of Patient follow up with Physician:      POC update due: (10 visits /OR AUTH LIMITS, whichever is less)  25                                            Precautions/ Contra-indications:

## 2025-01-13 ENCOUNTER — HOSPITAL ENCOUNTER (OUTPATIENT)
Dept: PHYSICAL THERAPY | Age: 45
Setting detail: THERAPIES SERIES
Discharge: HOME | End: 2025-01-13
Payer: COMMERCIAL

## 2025-01-13 PROCEDURE — 97530 THERAPEUTIC ACTIVITIES: CPT | Performed by: PHYSICAL THERAPIST

## 2025-01-13 PROCEDURE — 97110 THERAPEUTIC EXERCISES: CPT | Performed by: PHYSICAL THERAPIST

## 2025-01-13 NOTE — FLOWSHEET NOTE
Toe in and adducted foot position.   5/31/24: pt ambulates well with the rolling walker. Decreased DF control.   10/10/24: decreased balance and strength with cane ambulation. Decreased step height. Hip weakness noted.     Balance:  [x] WNL      [] NT       [] Dysfunction noted  Comment:     Falls Risk Assessment (30 days):   Falls Risk assessed and no intervention required.  Time Up and Go (TUG):   Not Assessed        Exercises/Interventions   Left leg   Therapeutic Ex (94444)  resistance Sets/time Reps Notes/Cues/Progressions   Seated hip flexion 5#/0# 2x10  Left leg   LAQ  SAQ  Glute squeeze 0#  3#  Red ball 2x10  2-3x10  [id number]  Left leg  Left leg  Strap around ankles     TP  INV isometric with ball  2g73cit  5x5sec     Seated foam roll squeeze  [id number]      hip abduction     Bridge on step Standing HS curl   1x10  Poor AROM   Supine hip abduction with slide board       Seated soleus       Standing abduction       Manual Intervention (27508)  TIME     Foot/ankle mobility                                   NMR re-education (23479) resistance Sets/time Reps CUES NEEDED   FWD tap cone/roz/foam pad       Sit to stand       TKE ball on wall       Side stepping around table       Hip flexion seated/bilateral ER       Therapeutic Activity (71792)  Sets/time     Shoulder extension  Scapular retraction  Lat pull down  Tricep extension            LAQ/bicep curls       Seated HABD/adduction squeeze       Clocks          Modalities:    No modalities applied this session    Education/Home Exercise Program: Patient HEP program created electronically.  Refer to [employer] access code:    Access Code: [email or web address]  URL: [email or web address]    ASSESSMENT     Functional Impairments:   Decreased LE functional ROM  Decreased core/proximal hip strength and neuromuscular control  Decreased LE functional strength   Reduced balance/proprioceptive control    Functional Activity Limitations (from functional questionnaire and

## 2025-01-20 ENCOUNTER — HOSPITAL ENCOUNTER (OUTPATIENT)
Dept: PHYSICAL THERAPY | Age: 45
Setting detail: THERAPIES SERIES
Discharge: HOME | End: 2025-01-20
Payer: COMMERCIAL

## 2025-01-20 PROCEDURE — 97530 THERAPEUTIC ACTIVITIES: CPT | Performed by: PHYSICAL THERAPIST

## 2025-01-20 PROCEDURE — 97110 THERAPEUTIC EXERCISES: CPT | Performed by: PHYSICAL THERAPIST

## 2025-01-20 NOTE — FLOWSHEET NOTE
Prognosis for POC: [] Good [x] Fair  [] Poor    Patient requires continued skilled intervention: [x] Yes  [] No      CHARGE CAPTURE     PT CHARGE GRID   CPT Code (TIMED) minutes # CPT Code (UNTIMED) #     Therex (94340)  30 2  EVAL:LOW (46926 - Typically 20 minutes face-to-face)     Neuromusc. Re-ed (31565)    Re-Eval (26648)     Manual (01848)    Estim Unattended (62655)     Ther. Act (23411) 15 1  Mech. Traction (73788)     Gait (41258)    Dry Needle 1-2 muscle (94345)     Aquatic Therex (73815)    Dry Needle 3+ muscle (20561)     Iontophoresis (92758)    VASO (73250)     Ultrasound (32673)    Group Therapy (73250)     Estim Attended (09985)    Canalith Repositioning (20376)     Other:    Other:    Total Timed Code Tx Minutes 45 3       Total Treatment Minutes 45        Charge Justification:  (34446) THERAPEUTIC EXERCISE - Provided verbal/tactile cueing for activities related to strengthening, flexibility, endurance, ROM performed to prevent loss of range of motion, maintain or improve muscular strength or increase flexibility, following either an injury or surgery.   (88085) HOME EXERCISE PROGRAM - Reviewed/Progressed HEP activities related to strengthening, flexibility, endurance, ROM performed to prevent loss of range of motion, maintain or improve muscular strength or increase flexibility, following either an injury or surgery.  (42555) NEUROMUSCULAR RE-EDUCATION - Therapeutic procedure, 1 or more areas, each 15 minutes; neuromuscular reeducation of movement, balance, coordination, kinesthetic sense, posture, and/or proprioception for sitting and/or standing activities  (67469) HOME EXERCISE PROGRAM - Reviewed/Progressed HEP activities related to neuromuscular reeducation of movement, balance, coordination, kinesthetic sense, posture, and/or proprioception for sitting and/or standing activities    (49985) THERAPEUTIC ACTIVITY - use of dynamic activities to improve functional performance. (Ex include

## 2025-01-27 ENCOUNTER — HOSPITAL ENCOUNTER (OUTPATIENT)
Dept: PHYSICAL THERAPY | Age: 45
Setting detail: THERAPIES SERIES
Discharge: HOME | End: 2025-01-27
Payer: COMMERCIAL

## 2025-01-27 PROCEDURE — 97110 THERAPEUTIC EXERCISES: CPT | Performed by: PHYSICAL THERAPIST

## 2025-01-27 PROCEDURE — 97530 THERAPEUTIC ACTIVITIES: CPT | Performed by: PHYSICAL THERAPIST

## 2025-01-27 NOTE — FLOWSHEET NOTE
[facility]- Outpatient Rehabilitation and Therapy [address], [place] [address] office: [phone] fax: [phone]      Physical Therapy: TREATMENT/PROGRESS NOTE   Patient: Juliocesar Freire (44 y.o. male)   Examination Date: 2025   :  1980 MRN: [id number]   Visit #: 34   Insurance Allowable Auth Needed   30 []Yes    []No    Ins[facility] / Plan: [employer] [place] MEDICAID / Product Type: *No Product type* /   Insurance ID: [id number] - (Medicaid Managed)  Secondary Insurance (if applicable):    Treatment Diagnosis:    No diagnosis found.       Medical Diagnosis:  Radiculopathy, lumbosacral region [M54.17]   Referring Physician: Patrick Conde MD  PCP: Felicita Brooks MD       Plan of care signed (Y/N):     Date of Patient follow up with Physician: 25     POC update due: (10 visits /OR AUTH LIMITS, whichever is less)  25                                            Precautions/ Contra-indications:           Latex allergy:  NO  Pacemaker:    NO  Contraindications for Manipulation: None and NA  Date of Surgery:    Other:    Red Flags:  None    C-SSRS Triggered by Intake questionnaire:   [x] No, Questionnaire did not trigger screening.   [] Yes, Patient intake triggered further evaluation      [] C-SSRS Screening completed  [] PCP notified via Plan of Care  [] Emergency services notified     Preferred Language for Healthcare:   [x] English       [] other:    SUBJECTIVE EXAMINATION     Patient stated complaint: Patient has no new c/o's today.        Test used Initial score  2025   Pain Summary VAS 7/10 0/10   Functional questionnaire Modified Oswestry 62%    Other:              Pain:  Pain location: left leg and low back  Patient describes pain to be constant and intermittent  Pain decreases with:     Pain increases with: Standing, Walking, Sitting, and [place] movements, self care, stairs, lifting     Relevant Medical History: depression,

## 2025-02-03 ENCOUNTER — HOSPITAL ENCOUNTER (OUTPATIENT)
Dept: PHYSICAL THERAPY | Age: 45
Setting detail: THERAPIES SERIES
Discharge: HOME | End: 2025-02-03
Payer: COMMERCIAL

## 2025-02-03 PROCEDURE — 97110 THERAPEUTIC EXERCISES: CPT | Performed by: PHYSICAL THERAPIST

## 2025-02-03 PROCEDURE — 97530 THERAPEUTIC ACTIVITIES: CPT | Performed by: PHYSICAL THERAPIST

## 2025-02-03 NOTE — PLAN OF CARE
[facility]- Outpatient Rehabilitation and Therapy [address], [place] [address] office: [phone] fax: [phone]    Physical Therapy Re-Certification Plan of Care    Dear Felicita Brooks MD  ,    We had the pleasure of treating the following patient for physical therapy services at [facility]. A summary of our findings can be found in the updated assessment below.  This includes our plan of care.  If you have any questions or concerns regarding these findings, please do not hesitate to contact me at the office phone number checked above.  Thank you for the referral.     Physician Signature:________________________________Date:__________________  By signing above (or electronic signature), therapist's plan is approved by physician      Total Visits: 35     Overall Response to Treatment:  Patient is able to do the exercises with moderate assist. He does well with upper body strengthening and right leg strengthening. Pt has poor left LE strength, poor gait pattern. He is using the cane, walker and wheelchair. He also uses the AFO.     Recommendation:    [x] Continue PT 1x / wk for 6 weeks.   [] Hold PT, pending MD visit   [] Discharge to [facility]. Follow up with PT or MD PRN.        Physical Therapy: TREATMENT/PROGRESS NOTE   Patient: Juliocesar Freire (44 y.o. male)   Examination Date: 2025   :  1980 MRN: [id number]   Visit #: 35   Insurance Allowable Auth Needed   30 []Yes    []No    Ins[facility] / [facility] [place] [facility] / Product Type: *No Product type* /   Insurance ID: [id number] - (Medicaid Managed)  Secondary Insurance (if applicable):    Treatment Diagnosis:    No diagnosis found.       Medical Diagnosis:  Radiculopathy, lumbosacral region [M54.17]   Referring Physician: Felicita Brooks MD  PCP: Felicita Brooks MD       Plan of care signed (Y/N):     Date of Patient follow up with Physician: 25     POC

## 2025-02-10 ENCOUNTER — HOSPITAL ENCOUNTER (OUTPATIENT)
Dept: PHYSICAL THERAPY | Age: 45
Setting detail: THERAPIES SERIES
Discharge: HOME | End: 2025-02-10
Payer: COMMERCIAL

## 2025-02-10 PROCEDURE — 97110 THERAPEUTIC EXERCISES: CPT | Performed by: PHYSICAL THERAPIST

## 2025-02-10 PROCEDURE — 97530 THERAPEUTIC ACTIVITIES: CPT | Performed by: PHYSICAL THERAPIST

## 2025-02-10 NOTE — FLOWSHEET NOTE
[facility]- Outpatient Rehabilitation and Therapy [address], [place] [address] office: [phone] fax: [phone]        Physical Therapy: TREATMENT/PROGRESS NOTE   Patient: Juliocesar Freire (44 y.o. male)   Examination Date: 02/10/2025   :  1980 MRN: [id number]   Visit #: 36   Insurance Allowable Auth Needed   30 []Yes    []No    Ins[facility]: [facility] / [facility]: [facility] [place] [facility] / Product Type: *No Product type* /   Insurance ID: [id number] - (Medicaid Managed)  Secondary Insurance (if applicable):    Treatment Diagnosis:    No diagnosis found.       Medical Diagnosis:  Radiculopathy, lumbosacral region [M54.17]   Referring Physician: Felicita Brooks MD  PCP: Felicita Brooks MD       Plan of care signed (Y/N):     Date of Patient follow up with Physician: 25     POC update due: (10 visits /OR AUTH LIMITS, whichever is less)  3/3/25                                            Precautions/ Contra-indications:           Latex allergy:  NO  Pacemaker:    NO  Contraindications for Manipulation: None and NA  Date of Surgery:    Other:    Red Flags:  None    C-SSRS Triggered by Intake questionnaire:   [x] No, Questionnaire did not trigger screening.   [] Yes, Patient intake triggered further evaluation      [] C-SSRS Screening completed  [] PCP notified via Plan of Care  [] Emergency services notified     Preferred Language for Healthcare:   [x] English       [] other:    SUBJECTIVE EXAMINATION     Patient stated complaint: Patient has increased knee swelling today. Pt states the swelling travels from knee to foot to ankle day to day and week to week. Pt did more walking this weekend.        Test used Initial score  2/26/24 02/10/2025   Pain Summary VAS 7/10 0/10   Functional questionnaire Modified Oswestry 62%    Other:              Pain:  Pain location: left leg and low back  Patient describes pain to be constant and intermittent  Pain decreases with:     Pain

## 2025-02-17 ENCOUNTER — HOSPITAL ENCOUNTER (OUTPATIENT)
Dept: PHYSICAL THERAPY | Age: 45
Setting detail: THERAPIES SERIES
Discharge: HOME | End: 2025-02-17
Payer: COMMERCIAL

## 2025-02-17 PROCEDURE — 97110 THERAPEUTIC EXERCISES: CPT | Performed by: PHYSICAL THERAPIST

## 2025-02-17 PROCEDURE — 97530 THERAPEUTIC ACTIVITIES: CPT | Performed by: PHYSICAL THERAPIST

## 2025-02-17 NOTE — FLOWSHEET NOTE
[facility]- Outpatient Rehabilitation and Therapy [address], [place] [address] office: [phone] fax: [phone]        Physical Therapy: TREATMENT/PROGRESS NOTE   Patient: Juliocesar Freire (44 y.o. male)   Examination Date: 2025   :  1980 MRN: [id number]   Visit #: 37   Insurance Allowable Auth Needed   30 []Yes    []No    Ins[facility]: [facility] / Plan: [employer] [place] MEDICAID / Product Type: *No Product type* /   Insurance ID: [id number] - (Medicaid Managed)  Secondary Insurance (if applicable):    Treatment Diagnosis:    No diagnosis found.       Medical Diagnosis:  Radiculopathy, lumbosacral region [M54.17]   Referring Physician: Felicita Brooks MD  PCP: Felicita Brooks MD       Plan of care signed (Y/N):     Date of Patient follow up with Physician: 25     POC update due: (10 visits /OR AUTH LIMITS, whichever is less)  3/3/25                                            Precautions/ Contra-indications:           Latex allergy:  NO  Pacemaker:    NO  Contraindications for Manipulation: None and NA  Date of Surgery:    Other:    Red Flags:  None    C-SSRS Triggered by Intake questionnaire:   [x] No, Questionnaire did not trigger screening.   [] Yes, Patient intake triggered further evaluation      [] C-SSRS Screening completed  [] PCP notified via Plan of Care  [] Emergency services notified     Preferred Language for Healthcare:   [x] English       [] other:    SUBJECTIVE EXAMINATION     Patient stated complaint: Patient has no new c/o's today. He feels the difference between tired and fatigue.        Test used Initial score  2025   Pain Summary VAS 7/10 0/10   Functional questionnaire Modified Oswestry 62%    Other:              Pain:  Pain location: left leg and low back  Patient describes pain to be constant and intermittent  Pain decreases with:     Pain increases with: Standing, Walking, Sitting, and [place] movements, self care, stairs,

## 2025-02-24 ENCOUNTER — COMMUNITY OUTREACH (OUTPATIENT)
Dept: OTHER | Age: 45
End: 2025-02-24

## 2025-02-24 NOTE — PROGRESS NOTES
[facility]--CHW    CHW reached out and made contact with patient. After introducing myself and the partnership program, patient explained to CHW that he has been diagnosed with MS and lives with his 93yo grandfather. He explained that he was diagnosed in Dec of 2023 and he is currently wheel-chair bound. He has applied for [employer] benefits, but since he did not pay into [employer], they are trying to refuse his claim. Patient does have an . He also reports that he has a female friend that assists him with his personal affairs, both physically and medically. Her name is Carmenza Casiano. Patient states that he has a food box delivery every Thursday and that he is signed up thru the waiver program and has a home health aide coming daily (up to 32 hours a week).    CHW explained to patient that I will look further into his chart to see if there are any other social barriers that he may have and will call him back in a couple of days.

## 2025-02-28 ENCOUNTER — COMMUNITY OUTREACH (OUTPATIENT)
Dept: OTHER | Age: 45
End: 2025-02-28

## 2025-02-28 NOTE — PROGRESS NOTES
[facility]--CHW    CHW called patient. CHW explained to patient that after reviewing his chart and investigating further, his social barriers are being worked on with the help of his friend Carmenza Casiano. CHW would be overlapping services that patient is already receiving at this time. Patient expressed that he understood.    Thus, the referral will be closed at this time.

## 2025-03-07 ENCOUNTER — TELEPHONE (OUTPATIENT)
Dept: INTERNAL MEDICINE CLINIC | Age: 45
End: 2025-03-07

## 2025-03-17 ENCOUNTER — HOSPITAL ENCOUNTER (OUTPATIENT)
Dept: PHYSICAL THERAPY | Age: 45
Setting detail: THERAPIES SERIES
Discharge: HOME | End: 2025-03-17
Payer: COMMERCIAL

## 2025-03-17 ENCOUNTER — TELEPHONE (OUTPATIENT)
Dept: INTERNAL MEDICINE CLINIC | Age: 45
End: 2025-03-17

## 2025-03-17 PROCEDURE — 97110 THERAPEUTIC EXERCISES: CPT | Performed by: PHYSICAL THERAPIST

## 2025-03-17 NOTE — TELEPHONE ENCOUNTER
Patient called to let doctor know that Urology has been sending over a paper that they need back signed in order to provide service to patient. Patient is trying to receive a catheter and needs the papers back before he can get it.

## 2025-03-17 NOTE — PLAN OF CARE
[facility]- Outpatient Rehabilitation and Therapy [address], [place] [address] office: [phone] fax: [phone]  Physical Therapy Re-Certification Plan of Care    Dear Felicita Brooks MD  ,    We had the pleasure of treating the following patient for physical therapy services at [facility] Out[facility] Physical Therapy. A summary of our findings can be found in the updated assessment below.  This includes our plan of care.  If you have any questions or concerns regarding these findings, please do not hesitate to contact me at the office phone number checked above.  Thank you for the referral.     Physician Signature:________________________________Date:__________________  By signing above (or electronic signature), therapist's plan is approved by physician      Total Visits: 38     Overall Response to Treatment:  Patient is responding well to treatment and improvement is noted with regards to goals    Recommendation:    [x] Continue PT 1-2x / wk for 6 weeks from 3/17/2025   [] Hold PT, pending MD visit   [] Discharge to [facility]. Follow up with PT or MD PRN.    Physical Therapy: TREATMENT/PROGRESS NOTE   Patient: Juliocesar Freire (44 y.o. male)   Examination Date: 2025   :  1980 MRN: [id number]   Visit #: 38   Insurance Allowable Auth Needed   30 []Yes    []No    Ins[facility]: [facility] / Plan: [facility] MEDICAID / Product Type: *No Product type* /   Insurance ID: [id number] - (Medicaid Managed)  Secondary Insurance (if applicable):    Treatment Diagnosis:    No diagnosis found.       Medical Diagnosis:  Radiculopathy, lumbosacral region [M54.17]   Referring Physician: Felicita Brooks MD  PCP: Felicita Brooks MD       Plan of care signed (Y/N):     Date of Patient follow up with Physician: 25    POC updated today for dates 2025 to 3/17/2025     POC update due: (10 visits /OR AUTH LIMITS, whichever is less)  4/15/25

## 2025-03-19 DIAGNOSIS — E55.9 VITAMIN D DEFICIENCY: ICD-10-CM

## 2025-03-19 RX ORDER — ERGOCALCIFEROL 1.25 MG/1
CAPSULE, LIQUID FILLED ORAL
Qty: 12 CAPSULE | Refills: 1 | Status: SHIPPED | OUTPATIENT
Start: 2025-03-19

## 2025-03-19 NOTE — TELEPHONE ENCOUNTER
Medication:   Requested Prescriptions     Pending Prescriptions Disp Refills    vitamin D (ERGOCALCIFEROL) 1.25 MG (81026 UT) CAPS capsule [Pharmacy Med Name: VITAMIN D 96750VCT CAPSULE] 12 capsule 1     Sig: TAKE ONE (1) CAPSULE BY MOUTH ONCE A WEEK        Last Filled:      Patient Phone Number: [phone] (home)     Last appt: 12/17/2024   Next appt: 4/17/2025    Last OARRS:        No data to display

## 2025-03-24 ENCOUNTER — HOSPITAL ENCOUNTER (OUTPATIENT)
Dept: PHYSICAL THERAPY | Age: 45
Setting detail: THERAPIES SERIES
Discharge: HOME | End: 2025-03-24
Payer: COMMERCIAL

## 2025-03-24 PROCEDURE — 97530 THERAPEUTIC ACTIVITIES: CPT | Performed by: PHYSICAL THERAPIST

## 2025-03-24 PROCEDURE — 97110 THERAPEUTIC EXERCISES: CPT | Performed by: PHYSICAL THERAPIST

## 2025-03-24 NOTE — FLOWSHEET NOTE
decreased step length, decreased trunk rotation, shuffles, and left foot drop   Assistive Device Used: Single point cane on right and Rolling walker (RW). Wheelchair and ramp.   Left leg moves into adduction. He has to circumduct to lift leg and move it forward. Toe in and adducted foot position.   5/31/24: pt ambulates well with the rolling walker. Decreased DF control.   10/10/24: decreased balance and strength with cane ambulation. Decreased step height. Hip weakness noted.   3/17/25: patient ambulates with SPC with decreased martir and step length and with left foot in an adducted position    Balance:  [x] WNL      [] NT       [] Dysfunction noted  Comment:     Falls Risk Assessment (30 days):   Falls Risk assessed and no intervention required.  Time Up and Go (TUG):   Not Assessed        Exercises/Interventions   Left leg   Therapeutic Ex (15316)  resistance Sets/time Reps Notes/Cues/Progressions   Seated hip flexion 0# 2x10  Left leg   LAQ w eccentric focus 0#   2x10  Left leg   Seated hip abduction isometric Blue strap 3\" / 2 10    SAQ 3# 3x10  Left leg   Clamshell  bridging ball 2 10 Support for L LE   Seated heel/toe raise  3\" / 1  10 Minimal ROM   Mini squat w UE support  1 15 Break at 10. Cueing for hip hinge needed   Lateral stepping  10 reps  With support   Manual Intervention (74073)  TIME     Foot/ankle mobility                                    NMR re-education (34941) resistance Sets/time Reps CUES NEEDED   FWD tap cone/roz/foam pad       Sit to stand       TKE ball on wall       Side stepping around table       Hip flexion seated/bilateral ER       Therapeutic Activity (72808)  Sets/time     Scapular retraction             LAQ/bicep curls       Seated HABD/adduction squeeze                Modalities:    No modalities applied this session    Education/Home Exercise Program: Patient HEP program created electronically.  Refer to [employer] access code:    Access Code: [email or web address]  URL:

## 2025-03-26 ENCOUNTER — TELEPHONE (OUTPATIENT)
Dept: INTERNAL MEDICINE CLINIC | Age: 45
End: 2025-03-26

## 2025-03-26 NOTE — TELEPHONE ENCOUNTER
Pt call due Zana telling him  they have fax forms for incontinence to PCP. PT needs forms sign soon.   Fax: [phone]

## 2025-03-31 ENCOUNTER — HOSPITAL ENCOUNTER (OUTPATIENT)
Dept: PHYSICAL THERAPY | Age: 45
Setting detail: THERAPIES SERIES
Discharge: HOME | End: 2025-03-31
Payer: COMMERCIAL

## 2025-03-31 PROCEDURE — 97110 THERAPEUTIC EXERCISES: CPT | Performed by: PHYSICAL THERAPIST

## 2025-03-31 PROCEDURE — 97140 MANUAL THERAPY 1/> REGIONS: CPT | Performed by: PHYSICAL THERAPIST

## 2025-03-31 NOTE — FLOWSHEET NOTE
location:   Patient describes pain to be none  Pain decreases with:     Pain increases with: Standing, Walking, Sitting, and [place] movements, self care, stairs, lifting     Relevant Medical History: depression, neuropathy, tobacco use, recently diagnosed MS    Occupation/School:  Work/School Status: Unemployed  Job Duties/Demands: NA    Sport/ Recreation/ Leisure/ Hobbies:      Review Of Systems (ROS):  [x] Performed Review of systems (Integumentary, CardioPulmonary, Neurological) by intake and observation. Intake form has been scanned into medical record. Patient has been instructed to contact their primary care physician regarding ROS issues if not already being addressed at this time.    [x] Patient history, allergies, meds reviewed. Medical chart reviewed. See intake form.     Co-morbidities/Complexities (which will affect course of rehabilitation):   []None        Arthritic conditions  [] Rheumatoid arthritis (M05.9)  [] Osteoarthritis (M19.91)  [] Gout        Neurological conditions  [] Prior Stroke (I69.30)  [] Parkinson's (G20)  [] Encephalopathy (G93.40)  [x] Multiple Sclerosis (G35)  [] Post-polio (G14)  [] Spinal cord injury (SCI)  [] Traumatic brain injury (TBI)  [] ALS    Gastrointestinal conditions   [] Constipation (K59.00)  [] Chron's/ulcerative colitis    Endocrine conditions   [] Hypothyroid (E03.9)  [] Hyperthyroid [] Hx of COVID    Musculoskeletal conditions  [] Disc pathology   [] Congenital spine pathologies   [] Osteoporosis (M81.8)  [] Osteopenia (M85.8)  [] Scoliosis    Cardio/Pulmonary conditions  [] Asthma (J45)  [] Coughing   [] COPD (J44.9)  [] CHF  [] A-fib          Rheumatological conditions  [] Fibromyalgia (M79.7)  [] Lupus  [] Sjogrens  [] Ankylosing spondylitis  [] Other autoimmune       Metabolic conditions  [] Morbid obesity (E66.01)  [] Diabetes type 1(E10.65) or 2 (E11.65)   [x] Neuropathy (G60.9)        Cardiovascular conditions  [] Hypertension (I10)  [] Hyperlipidemia

## 2025-03-31 NOTE — TELEPHONE ENCOUNTER
Faxed both the certification paperwork signed by Dr. Brooks, and the Incontinence supply request signed by  By Dr. Brooks to the number on the paperwork that was faxed to us, to fax number: [phone].

## 2025-04-03 DIAGNOSIS — F32.89 OTHER DEPRESSION: ICD-10-CM

## 2025-04-03 NOTE — TELEPHONE ENCOUNTER
Medication:   Requested Prescriptions     Pending Prescriptions Disp Refills    sertraline (ZOLOFT) 50 MG tablet [Pharmacy Med Name: SERTRALINE HYDROCHLORIDE 50MG TABLET] 90 tablet 0     Sig: TAKE ONE (1) TABLET BY MOUTH DAILY        Last Filled:      Patient Phone Number: [phone] (home)     Last appt: 12/17/2024   Next appt: 4/17/2025    Last OARRS:        No data to display

## 2025-04-14 ENCOUNTER — HOSPITAL ENCOUNTER (OUTPATIENT)
Dept: PHYSICAL THERAPY | Age: 45
Setting detail: THERAPIES SERIES
Discharge: HOME | End: 2025-04-14
Payer: COMMERCIAL

## 2025-04-14 PROCEDURE — 97530 THERAPEUTIC ACTIVITIES: CPT | Performed by: PHYSICAL THERAPIST

## 2025-04-14 PROCEDURE — 97110 THERAPEUTIC EXERCISES: CPT | Performed by: PHYSICAL THERAPIST

## 2025-04-14 NOTE — FLOWSHEET NOTE
weeks for the following treatment interventions:    Interventions:  [x] Therapeutic exercise including: strength training, ROM, including postural re-education.   [x] NMR activation and proprioception, including postural re-education.    [x] Manual therapy as indicated to include: PROM and STM  [x] Modalities as needed that may include: Cryotherapy, Electrical Stimulation, Biofeedback, and Thermal Agents  [x] Patient education on joint protection, postural re-education, activity modification, progression of HEP.        [] Aquatic Therapy    Plan: ROM, stretching, strengthening, gait training.    Electronically Signed by Alma Medina, PT  Date: 04/14/2025  Therapist was present, directed the patient's care, made skilled judgement, and was responsible for assessment and treatment of the patient.         Note: Portions of this note have been templated and/or copied from initial evaluation, reassessments and prior notes for documentation efficiency.

## 2025-04-17 NOTE — PROGRESS NOTES
Patient's  shows they are overdue for Hemoglobin A1C  Care Everywhere and  files searched.   updated with 5/4/24 hemoglobin A1C result.

## 2025-04-21 ENCOUNTER — HOSPITAL ENCOUNTER (OUTPATIENT)
Dept: PHYSICAL THERAPY | Age: 45
Setting detail: THERAPIES SERIES
Discharge: HOME | End: 2025-04-21
Payer: COMMERCIAL

## 2025-04-21 PROCEDURE — 97110 THERAPEUTIC EXERCISES: CPT | Performed by: PHYSICAL THERAPIST

## 2025-04-21 PROCEDURE — 97530 THERAPEUTIC ACTIVITIES: CPT | Performed by: PHYSICAL THERAPIST

## 2025-04-21 NOTE — PLAN OF CARE
muscle (01378)     Aquatic Therex (67237)    Dry Needle 3+ muscle (29029)     Iontophoresis (98296)    VASO (29574)     Ultrasound (27783)    Group Therapy (13892)     Estim Attended (72815)    Canalith Repositioning (63560)     Other:    Other:    Total Timed Code Tx Minutes 50 3       Total Treatment Minutes 50        Charge Justification:  (31281) THERAPEUTIC EXERCISE - Provided verbal/tactile cueing for HEP and/or activities related to strengthening, flexibility, endurance, ROM performed to prevent loss of range of motion, maintain or improve muscular strength or increase flexibility, following either an injury or surgery.   (28115) THERAPEUTIC ACTIVITY - use of dynamic activities to improve functional performance. (Ex include squatting, ascending/descending stairs, walking, bending, lifting, catching, throwing, pushing, pulling, jumping.)  Direct, one on one contact, billed in 15-minute increments.      GOALS     Patient stated goal: everyday living.   Status:  [x] Progressing: [] Met: [] Not Met: [] Adjusted    Therapist goals for Patient:   Short Term Goals: To be achieved in: 2 weeks  Independent in HEP and progression per patient tolerance, in order to progress toward full function and prevent re-injury.    Status: [x] Progressing: [] Met: [] Not Met: [] Adjusted  Patient will have a decrease in pain to 2/10 to help facilitate improvement in movement, function, and ADLs as indicated by functional deficits.   Status: [x] Progressing: [] Met: [] Not Met: [] Adjusted    Long Term Goals: To be achieved in: 8-10 weeks from 3/17/2025  Disability index score of 25% or less for the Modified Oswestry to assist with return top prior level of function.   Status: [] Progressing: [x] Met: [] Not Met: [] Adjusted  LLE AROM = RLE AROM to allow for proper joint functioning as indicated by patients functional deficits.  Status: [] Progressing: [] Met: [x] Not Met: [] Adjusted  Pt to improve strength to WFL of proximal hip,

## 2025-04-24 ENCOUNTER — OFFICE VISIT (OUTPATIENT)
Dept: INTERNAL MEDICINE CLINIC | Age: 45
End: 2025-04-24
Payer: COMMERCIAL

## 2025-04-24 VITALS
BODY MASS INDEX: 28.48 KG/M2 | WEIGHT: 204.2 LBS | HEART RATE: 66 BPM | TEMPERATURE: 97.9 F | OXYGEN SATURATION: 95 % | SYSTOLIC BLOOD PRESSURE: 110 MMHG | DIASTOLIC BLOOD PRESSURE: 72 MMHG

## 2025-04-24 DIAGNOSIS — E55.9 VITAMIN D DEFICIENCY: ICD-10-CM

## 2025-04-24 DIAGNOSIS — F17.210 SMOKES CIGARETTES: ICD-10-CM

## 2025-04-24 DIAGNOSIS — G47.09 OTHER INSOMNIA: ICD-10-CM

## 2025-04-24 DIAGNOSIS — R73.03 PREDIABETES: ICD-10-CM

## 2025-04-24 DIAGNOSIS — F32.89 OTHER DEPRESSION: Primary | ICD-10-CM

## 2025-04-24 DIAGNOSIS — G35 MS (MULTIPLE SCLEROSIS) (HCC): ICD-10-CM

## 2025-04-24 DIAGNOSIS — J43.8 OTHER EMPHYSEMA (HCC): ICD-10-CM

## 2025-04-24 PROCEDURE — 3023F SPIROM DOC REV: CPT | Performed by: INTERNAL MEDICINE

## 2025-04-24 PROCEDURE — 99214 OFFICE O/P EST MOD 30 MIN: CPT | Performed by: INTERNAL MEDICINE

## 2025-04-24 PROCEDURE — G8427 DOCREV CUR MEDS BY ELIG CLIN: HCPCS | Performed by: INTERNAL MEDICINE

## 2025-04-24 PROCEDURE — G8419 CALC BMI OUT NRM PARAM NOF/U: HCPCS | Performed by: INTERNAL MEDICINE

## 2025-04-24 PROCEDURE — 4004F PT TOBACCO SCREEN RCVD TLK: CPT | Performed by: INTERNAL MEDICINE

## 2025-04-24 PROCEDURE — G2211 COMPLEX E/M VISIT ADD ON: HCPCS | Performed by: INTERNAL MEDICINE

## 2025-04-24 RX ORDER — TRAZODONE HYDROCHLORIDE 100 MG/1
100 TABLET ORAL NIGHTLY PRN
Qty: 90 TABLET | Refills: 1 | Status: SHIPPED | OUTPATIENT
Start: 2025-04-24

## 2025-04-24 RX ORDER — TIOTROPIUM BROMIDE 18 UG/1
18 CAPSULE ORAL; RESPIRATORY (INHALATION) DAILY
Qty: 30 CAPSULE | Refills: 3 | Status: SHIPPED | OUTPATIENT
Start: 2025-04-24

## 2025-04-24 RX ORDER — BACLOFEN 10 MG/1
10 TABLET ORAL 2 TIMES DAILY
COMMUNITY

## 2025-04-24 RX ORDER — TRAZODONE HYDROCHLORIDE 50 MG/1
50 TABLET ORAL NIGHTLY
COMMUNITY
End: 2025-04-24 | Stop reason: DRUGHIGH

## 2025-04-24 SDOH — ECONOMIC STABILITY: FOOD INSECURITY: WITHIN THE PAST 12 MONTHS, YOU WORRIED THAT YOUR FOOD WOULD RUN OUT BEFORE YOU GOT MONEY TO BUY MORE.: NEVER TRUE

## 2025-04-24 SDOH — ECONOMIC STABILITY: INCOME INSECURITY: IN THE LAST 12 MONTHS, WAS THERE A TIME WHEN YOU WERE NOT ABLE TO PAY THE MORTGAGE OR RENT ON TIME?: YES

## 2025-04-24 SDOH — ECONOMIC STABILITY: FOOD INSECURITY: WITHIN THE PAST 12 MONTHS, THE FOOD YOU BOUGHT JUST DIDN'T LAST AND YOU DIDN'T HAVE MONEY TO GET MORE.: NEVER TRUE

## 2025-04-24 ASSESSMENT — PATIENT HEALTH QUESTIONNAIRE - PHQ9
5. POOR APPETITE OR OVEREATING: NOT AT ALL
1. LITTLE INTEREST OR PLEASURE IN DOING THINGS: NOT AT ALL
8. MOVING OR SPEAKING SO SLOWLY THAT OTHER PEOPLE COULD HAVE NOTICED. OR THE OPPOSITE, BEING SO FIGETY OR RESTLESS THAT YOU HAVE BEEN MOVING AROUND A LOT MORE THAN USUAL: NOT AT ALL
SUM OF ALL RESPONSES TO PHQ QUESTIONS 1-9: 0
3. TROUBLE FALLING OR STAYING ASLEEP: NOT AT ALL
SUM OF ALL RESPONSES TO PHQ QUESTIONS 1-9: 0
6. FEELING BAD ABOUT YOURSELF - OR THAT YOU ARE A FAILURE OR HAVE LET YOURSELF OR YOUR FAMILY DOWN: NOT AT ALL
SUM OF ALL RESPONSES TO PHQ QUESTIONS 1-9: 0
SUM OF ALL RESPONSES TO PHQ QUESTIONS 1-9: 0
7. TROUBLE CONCENTRATING ON THINGS, SUCH AS READING THE NEWSPAPER OR WATCHING TELEVISION: NOT AT ALL
9. THOUGHTS THAT YOU WOULD BE BETTER OFF DEAD, OR OF HURTING YOURSELF: NOT AT ALL
4. FEELING TIRED OR HAVING LITTLE ENERGY: NOT AT ALL
2. FEELING DOWN, DEPRESSED OR HOPELESS: NOT AT ALL
10. IF YOU CHECKED OFF ANY PROBLEMS, HOW DIFFICULT HAVE THESE PROBLEMS MADE IT FOR YOU TO DO YOUR WORK, TAKE CARE OF THINGS AT HOME, OR GET ALONG WITH OTHER PEOPLE: NOT DIFFICULT AT ALL

## 2025-04-24 NOTE — PROGRESS NOTES
SUBJECTIVE:  Juliocesar Freire is a 44 y.o. male HERE FOR   Chief Complaint   Patient presents with    Follow-up    Depression      PT HERE FOR EVAL        DEPRESSION-   ON ZOLOFT - HELPING. + CRYING SPELLS - IMPROVED. + INSOMNIA. DENIES SUICIDAL / NO HOMICIDAL THOUGHTS  / IDEATION  INSOMNIA - RECENTLY STARTED ON TRAZODONE. STATES ALSO TAKING MELATONIN. MED CHANGE D/W PT  EMPHYSEMA - NO WHEEZING, NO SOB, NO INCREASED INHALER USE.     VIT D DEF - TAKING MED  - 58317 U Q WEEK. LAB D/W PT  PROGRESSIVE MS -  ON MED. FOLLOW UP WITH NEUROLOGY.  PT IN PHY. THERAPY - HELPING PER PT.   PREDIABETES - DIET / EXERCISE REVIEWED. PREVIOUS LABS D/W PT  + SMOKER - CESSATION READDRESSED.   ACUTE DVT - LT. NOTED ON DOPPLER 6/24. RESOLVED. PT COMPLETED ELIQUIS.  NO NEW ISSUES      DENIES CP, No SOB, No PALPITATIONS, No COUGH, NO F/C  No ABD PAIN, No N/V, No DIARRHEA, No CONSTIPATION, No MELENA, No HEMATOCHEZIA.  No DYSURIA, No FREQ, No URGENCY,  HEMATURIA- RESOLVED      PMH: REVIEWED AND UPDATED TODAY    PSH: REVIEWED AND UPDATED TODAY    SOCIAL HX: REVIEWED AND UPDATED TODAY    FAMILY HX: REVIEWED AND UPDATED TODAY    ALLERGY:  Pcn [penicillins]    MEDS: REVIEWED  Prior to Visit Medications    Medication Sig Taking? Authorizing Provider   traZODone (DESYREL) 50 MG tablet Take 1 tablet by mouth nightly Yes ProviderBertha MD   sertraline (ZOLOFT) 50 MG tablet TAKE ONE (1) TABLET BY MOUTH DAILY Yes Felicita Brooks MD   vitamin D (ERGOCALCIFEROL) 1.25 MG (19957 UT) CAPS capsule TAKE ONE (1) CAPSULE BY MOUTH ONCE A WEEK Yes Felicita Brooks MD   Multiple Vitamin (MULTI VITAMIN DAILY) TABS TAKE ONE (1) TABLET BY MOUTH DAILY Yes Felicita Brooks MD   carbamide peroxide (DEBROX) 6.5 % otic solution Place 5 drops into both ears 2 times daily Yes Felicita Brooks MD   Multiple Vitamins-Minerals (THERAPEUTIC MULTIVITAMIN-MINERALS) tablet Take 1 tablet by mouth daily Yes Felicita Brooks MD   tiotropium (SPIRIVA HANDIHALER) 18 MCG

## 2025-04-24 NOTE — PATIENT INSTRUCTIONS
TAKE MED AS ADVISED    DIET/ EXERCISE.    FOLLOW UP WITHIN 4 MONTHS / AS NEEDED    FOLLOW UP FOR FASTING LABS    [facility] Locations - No appointment necessary.  ? indicates the location is open Saturdays in addition to Monday through Friday.   Call your preferred location for test preparation, business hours and other information you need.   [facility] accepts all insurances.  [place]  [place]    ? [place]   [address]   [address]   [place] [address]    Ph: [phone]  [facility]   [address] [place] [address]    Ph: [phone]   ? [place]   [address]    [place] [address]    Ph: [phone]     [facility]   [address] [place] [address]    Ph: [phone] ? [place]   [address] [address]   Ph: [phone]  ? [facility]   [address] [place] [address]    Ph: [phone]      [place]   [address] [place] [address]   Ph: [phone]     [facility]  [address] [place] [address]   Ph: [phone]    [place]    ? [facility]   [address] [place] [address]    Ph: [phone]  [facility]   [address] [place] [address]   Ph: [phone]  [place]   [address]   [facility], [address]    PH: [phone]    [facility]   [address] [place] [address]    Ph: [phone]  [place]  [address] [place] [address]  Ph: [phone]  [facility]   [address] [place] [address]    Ph: [phone]

## 2025-04-28 ENCOUNTER — HOSPITAL ENCOUNTER (OUTPATIENT)
Dept: PHYSICAL THERAPY | Age: 45
Setting detail: THERAPIES SERIES
Discharge: HOME | End: 2025-04-28
Payer: COMMERCIAL

## 2025-04-28 PROCEDURE — 97530 THERAPEUTIC ACTIVITIES: CPT | Performed by: PHYSICAL THERAPIST

## 2025-04-28 PROCEDURE — 97110 THERAPEUTIC EXERCISES: CPT | Performed by: PHYSICAL THERAPIST

## 2025-04-28 NOTE — FLOWSHEET NOTE
[facility]- Outpatient Rehabilitation and Therapy [address], [place] [address] office: [phone] fax: [phone]    Physical Therapy: TREATMENT/PROGRESS NOTE   Patient: Juliocesar Freire (44 y.o. male)   Examination Date: 2025   :  1980 MRN: [id number]   Visit #: 43   Insurance Allowable Auth Needed      C[employer]ource [x]Yes    []No    Insurance: Payor: CARESOURCE / Plan: [employer] [place] MEDICAID / Product Type: *No Product type* /   Insurance ID: [id number] - (Medicaid Managed)  Secondary Insurance (if applicable):    Treatment Diagnosis:    No diagnosis found.       Medical Diagnosis: Multiple Sclerosis  Radiculopathy, lumbosacral region [M54.17]   Referring Physician: Felicita Brooks MD  PCP: Felicita Brooks MD     Date of Patient follow up with Physician:      POC update due: (10 visits /OR AUTH LIMITS, whichever is less)  25                                            Precautions/ Contra-indications:           Latex allergy:  NO  Pacemaker:    NO  Contraindications for Manipulation: None and NA  Date of Surgery:    Other:    Red Flags:  None    C-SSRS Triggered by Intake questionnaire:   [x] No, Questionnaire did not trigger screening.   [] Yes, Patient intake triggered further evaluation      [] C-SSRS Screening completed  [] PCP notified via Plan of Care  [] Emergency services notified     Preferred Language for Healthcare:   [x] English       [] other:    SUBJECTIVE EXAMINATION     Patient stated complaint: Patient reports swelling and pain in the knee. The ankle looks better today. The swelling travels back and forth between the knee and the ankle from day to day.        Test used Initial score  2025   Pain Summary VAS 7/10 8/10 LBP   Functional questionnaire Modified Oswestry 62%    Strength Hip flexion  3/5    Hip abduction  3-/5    Hip extension  3/5    Knee extension  3/5    Knee flexion  3/5    DF  1/5    PF  1/5   Functional

## 2025-05-05 ENCOUNTER — HOSPITAL ENCOUNTER (OUTPATIENT)
Dept: PHYSICAL THERAPY | Age: 45
Setting detail: THERAPIES SERIES
Discharge: HOME | End: 2025-05-05
Payer: COMMERCIAL

## 2025-05-05 PROCEDURE — 97110 THERAPEUTIC EXERCISES: CPT | Performed by: PHYSICAL THERAPIST

## 2025-05-05 PROCEDURE — 97530 THERAPEUTIC ACTIVITIES: CPT | Performed by: PHYSICAL THERAPIST

## 2025-05-05 NOTE — FLOWSHEET NOTE
[] Not Met: [] Adjusted    Long Term Goals: To be achieved in: 8-10 weeks from 3/17/2025  Disability index score of 25% or less for the Modified Oswestry to assist with return top prior level of function.   Status: [] Progressing: [x] Met: [] Not Met: [] Adjusted  LLE AROM = RLE AROM to allow for proper joint functioning as indicated by patients functional deficits.  Status: [] Progressing: [] Met: [x] Not Met: [] Adjusted  Pt to improve strength to WFL of proximal hip, TrA/abdominal, posterior chain LE, quadriceps, gastroc/soleus, and hamstrings to allow for proper muscle and joint use in functional mobility, ADLs and prior level of function   Status: [] Progressing: [] Met: [x] Not Met: [] Adjusted  Patient will return to  Usual work, housework or activities, walking around house, lifting an object from the floor, light home activities, walk 2 blocks, up/down 1 flight of stairs, stand for length of time, sit for length of time, and bed mobility  without increased symptoms or restriction to work towards return to prior level of function.        Status: [] Progressing: [] Met: [x] Not Met: [] Adjusted  Patient will increase LE function to allow independence in all self-care activities.             Status: [] Progressing: [] Met: [x] Not Met: [] Adjusted    TREATMENT PLAN     Frequency/Duration: 2x/week for 8-10 weeks for the following treatment interventions:    Interventions:  [x] Therapeutic exercise including: strength training, ROM, including postural re-education.   [x] NMR activation and proprioception, including postural re-education.    [x] Manual therapy as indicated to include: PROM and STM  [x] Modalities as needed that may include: Cryotherapy, Electrical Stimulation, Biofeedback, and Thermal Agents  [x] Patient education on joint protection, postural re-education, activity modification, progression of HEP.        [] Aquatic Therapy    Plan: ROM, stretching, strengthening, gait

## 2025-05-12 ENCOUNTER — HOSPITAL ENCOUNTER (OUTPATIENT)
Dept: PHYSICAL THERAPY | Age: 45
Setting detail: THERAPIES SERIES
Discharge: HOME | End: 2025-05-12
Payer: COMMERCIAL

## 2025-05-12 PROCEDURE — 97530 THERAPEUTIC ACTIVITIES: CPT | Performed by: PHYSICAL THERAPIST

## 2025-05-12 PROCEDURE — 97110 THERAPEUTIC EXERCISES: CPT | Performed by: PHYSICAL THERAPIST

## 2025-05-12 NOTE — FLOWSHEET NOTE
[facility]- Outpatient Rehabilitation and Therapy [address], [place] [address] office: [phone] fax: [phone]    Physical Therapy: TREATMENT/PROGRESS NOTE   Patient: Juliocesar Freire (44 y.o. male)   Examination Date: 2025   :  1980 MRN: [id number]   Visit #: 45   Insurance Allowable Auth Needed      Caresource [x]Yes    []No    Insurance: Payor: CARESOURCE / Plan: [employer] [place] MEDICAID / Product Type: *No Product type* /   Insurance ID: [id number] - (Medicaid Managed)  Secondary Insurance (if applicable):    Treatment Diagnosis:    No diagnosis found.       Medical Diagnosis: Multiple Sclerosis  Radiculopathy, lumbosacral region [M54.17]   Referring Physician: Felicita Brooks MD  PCP: Felicita Brooks MD     Date of Patient follow up with Physician:      POC update due: (10 visits /OR AUTH LIMITS, whichever is less)  25                                            Precautions/ Contra-indications:           Latex allergy:  NO  Pacemaker:    NO  Contraindications for Manipulation: None and NA  Date of Surgery:    Other:    Red Flags:  None    C-SSRS Triggered by Intake questionnaire:   [x] No, Questionnaire did not trigger screening.   [] Yes, Patient intake triggered further evaluation      [] C-SSRS Screening completed  [] PCP notified via Plan of Care  [] Emergency services notified     Preferred Language for Healthcare:   [x] English       [] other:    SUBJECTIVE EXAMINATION     Patient stated complaint: Patient has swelling in the knee today. He is getting muscle spasms in the calf muscle. Pt continues to have LBP.        Test used Initial score  2025   Pain Summary VAS 7/10 5-7/10 LBP   Functional questionnaire Modified Oswestry 62%    Strength Hip flexion  3/5    Hip abduction  3-/5    Hip extension  3/5    Knee extension  3/5    Knee flexion  3/5    DF  1/5    PF  1/5   Functional mobility TUG  21.8 seconds     Pain:  Pain location:

## 2025-05-19 ENCOUNTER — HOSPITAL ENCOUNTER (OUTPATIENT)
Dept: PHYSICAL THERAPY | Age: 45
Setting detail: THERAPIES SERIES
Discharge: HOME | End: 2025-05-19
Payer: COMMERCIAL

## 2025-05-19 PROCEDURE — 97110 THERAPEUTIC EXERCISES: CPT | Performed by: PHYSICAL THERAPIST

## 2025-05-19 NOTE — FLOWSHEET NOTE
[facility]- Outpatient Rehabilitation and Therapy [address], [place] [address] office: [phone] fax: [phone]    Physical Therapy: TREATMENT/PROGRESS NOTE   Patient: Juliocesar Freire (44 y.o. male)   Examination Date: 2025   :  1980 MRN: [id number]   Visit #: 46   Insurance Allowable Auth Needed      Caresource []Yes    []No    Insurance: Payor: CARESOURCE / Plan: [employer] [place] MEDICAID / Product Type: *No Product type* /   Insurance ID: [id number] - (Medicaid Managed)  Secondary Insurance (if applicable):    Treatment Diagnosis:    1. Increased pain  R52         2. Foot drop, left foot  M21.372         3. Gait abnormality  R26.9         4. Decreased strength  R53.1              Medical Diagnosis: Multiple Sclerosis  Radiculopathy, lumbosacral region [M54.17]   Referring Physician: Felicita Brooks MD  PCP: Felicita Boroks MD     Date of Patient follow up with Physician:      POC update due: (10 visits /OR AUTH LIMITS, whichever is less)  25                                            Precautions/ Contra-indications:           Latex allergy:  NO  Pacemaker:    NO  Contraindications for Manipulation: None and NA  Date of Surgery:    Other:    Red Flags:  None    C-SSRS Triggered by Intake questionnaire:   [x] No, Questionnaire did not trigger screening.   [] Yes, Patient intake triggered further evaluation      [] C-SSRS Screening completed  [] PCP notified via Plan of Care  [] Emergency services notified     Preferred Language for Healthcare:   [x] English       [] other:    SUBJECTIVE EXAMINATION     Patient stated complaint: Patient reports he is not having a lot of pain except in his knee. The swelling has been up and down recently. Today it is not too bad.       Test used Initial score  2025   Pain Summary VAS 7/10 5-7/10 LBP   Functional questionnaire Modified Oswestry 62%    Strength Hip flexion  3/5    Hip abduction  3-/5    Hip

## 2025-05-19 NOTE — TELEPHONE ENCOUNTER
Medication:   Requested Prescriptions     Pending Prescriptions Disp Refills    Multiple Vitamin (MULTI VITAMIN) TABS [Pharmacy Med Name: MULTI VITAMIN  TABLET] 30 tablet 3     Sig: TAKE ONE (1) TABLET BY MOUTH DAILY     Last Filled:  09/16/2024    Last appt: 4/24/2025   Next appt: 8/26/2025    Last OARRS:        No data to display

## 2025-06-13 ENCOUNTER — HOSPITAL ENCOUNTER (OUTPATIENT)
Dept: PHYSICAL THERAPY | Age: 45
Setting detail: THERAPIES SERIES
Discharge: HOME | End: 2025-06-13
Payer: COMMERCIAL

## 2025-06-13 PROCEDURE — 97110 THERAPEUTIC EXERCISES: CPT | Performed by: PHYSICAL THERAPIST

## 2025-06-13 PROCEDURE — 97530 THERAPEUTIC ACTIVITIES: CPT | Performed by: PHYSICAL THERAPIST

## 2025-06-13 NOTE — PLAN OF CARE
PCP: Felicita Brooks MD     Date of Patient follow up with Physician: 8/26/25     POC update due: (10 visits /OR AUTH LIMITS, whichever is less)  7/13/25                                            Precautions/ Contra-indications:           Latex allergy:  NO  Pacemaker:    NO  Contraindications for Manipulation: None and NA  Date of Surgery:    Other:    Red Flags:  None    C-SSRS Triggered by Intake questionnaire:   [x] No, Questionnaire did not trigger screening.   [] Yes, Patient intake triggered further evaluation      [] C-SSRS Screening completed  [] PCP notified via Plan of Care  [] Emergency services notified     Preferred Language for Healthcare:   [x] English       [] other:    SUBJECTIVE EXAMINATION     Patient stated complaint: Patient has increased swelling in the knee. He would like to do more of a total body workout to improve his strength.        Test used Initial score  2/26/24 06/13/2025   Pain Summary VAS 7/10 4-5/10 LBP   Functional questionnaire Modified Oswestry 62%    Strength Hip flexion  3/5    Hip abduction  3-/5    Hip extension  3/5    Knee extension  3/5    Knee flexion  3/5    DF  1/5    PF  1/5   Functional mobility TUG  21.8 seconds     Pain:  Pain location: LBP  Patient describes pain to be constant  Pain decreases with:    Pain increases with: Standing, Walking, Sitting, and [place] movements, self care, stairs, lifting     Relevant Medical History: depression, neuropathy, tobacco use, MS    Occupation/School:  Work/School Status: Unemployed  Job Duties/Demands: NA    Sport/ Recreation/ Leisure/ Hobbies:      Review Of Systems (ROS):  [x] Performed Review of systems (Integumentary, CardioPulmonary, Neurological) by intake and observation. Intake form has been scanned into medical record. Patient has been instructed to contact their primary care physician regarding ROS issues if not already being addressed at this time.    [x] Patient history, allergies, meds reviewed. Medical chart

## 2025-06-16 ENCOUNTER — OFFICE VISIT (OUTPATIENT)
Dept: INTERNAL MEDICINE CLINIC | Age: 45
End: 2025-06-16
Payer: COMMERCIAL

## 2025-06-16 VITALS
DIASTOLIC BLOOD PRESSURE: 80 MMHG | WEIGHT: 194 LBS | TEMPERATURE: 97.7 F | OXYGEN SATURATION: 97 % | HEART RATE: 87 BPM | SYSTOLIC BLOOD PRESSURE: 118 MMHG | BODY MASS INDEX: 27.06 KG/M2

## 2025-06-16 DIAGNOSIS — E55.9 VITAMIN D DEFICIENCY: ICD-10-CM

## 2025-06-16 DIAGNOSIS — G35 MS (MULTIPLE SCLEROSIS) (HCC): ICD-10-CM

## 2025-06-16 DIAGNOSIS — J43.8 OTHER EMPHYSEMA (HCC): ICD-10-CM

## 2025-06-16 DIAGNOSIS — F17.210 SMOKES CIGARETTES: ICD-10-CM

## 2025-06-16 DIAGNOSIS — I82.432 ACUTE DEEP VEIN THROMBOSIS (DVT) OF POPLITEAL VEIN OF LEFT LOWER EXTREMITY (HCC): Primary | ICD-10-CM

## 2025-06-16 DIAGNOSIS — R73.03 PREDIABETES: ICD-10-CM

## 2025-06-16 DIAGNOSIS — F32.89 OTHER DEPRESSION: ICD-10-CM

## 2025-06-16 PROCEDURE — 99214 OFFICE O/P EST MOD 30 MIN: CPT | Performed by: INTERNAL MEDICINE

## 2025-06-16 PROCEDURE — 4004F PT TOBACCO SCREEN RCVD TLK: CPT | Performed by: INTERNAL MEDICINE

## 2025-06-16 PROCEDURE — G2211 COMPLEX E/M VISIT ADD ON: HCPCS | Performed by: INTERNAL MEDICINE

## 2025-06-16 PROCEDURE — 3023F SPIROM DOC REV: CPT | Performed by: INTERNAL MEDICINE

## 2025-06-16 PROCEDURE — G8419 CALC BMI OUT NRM PARAM NOF/U: HCPCS | Performed by: INTERNAL MEDICINE

## 2025-06-16 PROCEDURE — G8427 DOCREV CUR MEDS BY ELIG CLIN: HCPCS | Performed by: INTERNAL MEDICINE

## 2025-06-16 RX ORDER — ERGOCALCIFEROL 1.25 MG/1
50000 CAPSULE, LIQUID FILLED ORAL WEEKLY
Qty: 12 CAPSULE | Refills: 1 | Status: SHIPPED | OUTPATIENT
Start: 2025-06-16

## 2025-06-16 RX ORDER — TIOTROPIUM BROMIDE 18 UG/1
18 CAPSULE ORAL; RESPIRATORY (INHALATION) DAILY
Qty: 30 CAPSULE | Refills: 3 | Status: SHIPPED | OUTPATIENT
Start: 2025-06-16

## 2025-06-16 NOTE — PROGRESS NOTES
SUBJECTIVE:  Juliocesar Freire is a 44 y.o. male HERE FOR   Chief Complaint   Patient presents with    Follow-up    Other     DVT      PT HERE FOR EVAL     ACUTE DVT - LT. NEW ONSET. + PAIN AND SWELLING LT LOWER EXT. NOTED ON DOPPLER TODAY. H/O PRIOR DVT.    DEPRESSION-   ON ZOLOFT - HELPING. NO CRYING SPELLS. + INSOMNIA. DENIES SUICIDAL / NO HOMICIDAL THOUGHTS  / IDEATION  EMPHYSEMA - NO WHEEZING, NO SOB, NO INCREASED INHALER USE.     VIT D DEF - TAKING MED. . LAB D/W PT  PROGRESSIVE MS -  ON MED. FOLLOW UP WITH NEUROLOGY.  PT IN PHY. THERAPY - HELPING PER PT.   PREDIABETES - DIET / EXERCISE REVIEWED.  LABS D/W PT  + SMOKER - CESSATION READDRESSED.        DENIES CP, No SOB, No PALPITATIONS, No COUGH, NO F/C  No ABD PAIN, No N/V, No DIARRHEA, No CONSTIPATION, No MELENA, No HEMATOCHEZIA.  No DYSURIA, No FREQ, No URGENCY,  HEMATURIA- RESOLVED    PMH: REVIEWED AND UPDATED TODAY    PSH: REVIEWED AND UPDATED TODAY    SOCIAL HX: REVIEWED AND UPDATED TODAY    FAMILY HX: REVIEWED AND UPDATED TODAY    ALLERGY:  Pcn [penicillins]    MEDS: REVIEWED  Prior to Visit Medications    Medication Sig Taking? Authorizing Provider   Multiple Vitamin (MULTI VITAMIN) TABS TAKE ONE (1) TABLET BY MOUTH DAILY Yes Felicita Brooks MD   baclofen (LIORESAL) 10 MG tablet Take 1 tablet by mouth 2 times daily at 0800 and 1400 1 IN THE MORNING, 3 AT NIGHT Yes Provider, MD Bertha   sertraline (ZOLOFT) 50 MG tablet Take 1 tablet by mouth daily Yes Felicita Brooks MD   tiotropium (SPIRIVA HANDIHALER) 18 MCG inhalation capsule Inhale 1 capsule into the lungs daily Yes Felicita Brooks MD   traZODone (DESYREL) 100 MG tablet Take 1 tablet by mouth nightly as needed for Sleep Yes Felicita Brooks MD   vitamin D (ERGOCALCIFEROL) 1.25 MG (32832 UT) CAPS capsule TAKE ONE (1) CAPSULE BY MOUTH ONCE A WEEK Yes Felicita Brooks MD   carbamide peroxide (DEBROX) 6.5 % otic solution Place 5 drops into both ears 2 times daily Yes Felicita Brooks MD

## 2025-06-24 ENCOUNTER — HOSPITAL ENCOUNTER (OUTPATIENT)
Dept: PHYSICAL THERAPY | Age: 45
Setting detail: THERAPIES SERIES
Discharge: HOME | End: 2025-06-24
Payer: COMMERCIAL

## 2025-06-24 PROCEDURE — 97530 THERAPEUTIC ACTIVITIES: CPT | Performed by: PHYSICAL THERAPIST

## 2025-06-24 PROCEDURE — 97110 THERAPEUTIC EXERCISES: CPT | Performed by: PHYSICAL THERAPIST

## 2025-06-24 NOTE — FLOWSHEET NOTE
face-to-face) complexity using standardized patient assessment instrument and/or measurable assessment of functional outcome.    Today's Assessment:  pt challenged with addition of standing balance with FR rolling with L LE fatigue following.  Progressed CC exercises to standing in stride stance for strength progression with cuing as noted.    Medical Necessity Documentation:  I certify that this patient meets the below criteria necessary for medical necessity for care and/or justification of therapy services:  The patient has a musculoskeletal condition(s) with a corresponding ICD-10 code that is of complexity and severity that require skilled therapeutic intervention. This has a direct and significant impact on the need for therapy and significantly impacts the rate of recovery.     Prognosis for POC: [] Good [x] Fair  [] Poor    Patient requires continued skilled intervention: [x] Yes  [] No      CHARGE CAPTURE     PT CHARGE GRID   CPT Code (TIMED) minutes # CPT Code (UNTIMED) #     Therex (43422)  35 2  EVAL:LOW (09590 - Typically 20 minutes face-to-face)     Neuromusc. Re-ed (98758)    Re-Eval (66577)     Manual (78323)    Estim Unattended (47690)     Ther. Act (91447) 10 1  Mech. Traction (50654)     Gait (28681)    Dry Needle 1-2 muscle (52541)     Aquatic Therex (72947)    Dry Needle 3+ muscle (20561)     Iontophoresis (18684)    VASO (23933)     Ultrasound (20709)    Group Therapy (49793)     Estim Attended (74081)    Canalith Repositioning (06238)     Other:    Other:    Total Timed Code Tx Minutes 45 3       Total Treatment Minutes 45        Charge Justification:  (84561) THERAPEUTIC EXERCISE - Provided verbal/tactile cueing for HEP and/or activities related to strengthening, flexibility, endurance, ROM performed to prevent loss of range of motion, maintain or improve muscular strength or increase flexibility, following either an injury or surgery.   (21685) THERAPEUTIC ACTIVITY - use of dynamic

## 2025-06-27 ENCOUNTER — HOSPITAL ENCOUNTER (OUTPATIENT)
Dept: PHYSICAL THERAPY | Age: 45
Setting detail: THERAPIES SERIES
Discharge: HOME | End: 2025-06-27
Payer: COMMERCIAL

## 2025-06-27 PROCEDURE — 97110 THERAPEUTIC EXERCISES: CPT

## 2025-06-27 PROCEDURE — 97530 THERAPEUTIC ACTIVITIES: CPT

## 2025-06-27 NOTE — FLOWSHEET NOTE
[facility]- Outpatient Rehabilitation and Therapy [address], [place] [address] office: [phone] fax: [phone]      Physical Therapy: TREATMENT/PROGRESS NOTE   Patient: Juliocesar Freire (44 y.o. male)   Examination Date: 2025   :  1980 MRN: [id number]   Visit #: 49   Insurance Allowable Auth Needed      Caresource []Yes    []No    Insurance: Payor: CARESOURCE / Plan: [employer] [place] MEDICAID / Product Type: *No Product type* /   Insurance ID: [id number] - (Medicaid Managed)  Secondary Insurance (if applicable):    Treatment Diagnosis:    1. Increased pain  R52         2. Foot drop, left foot  M21.372         3. Gait abnormality  R26.9         4. Decreased strength  R53.1              Medical Diagnosis: Multiple Sclerosis  Radiculopathy, lumbosacral region [M54.17]   Referring Physician: Felicita Brooks MD  PCP: Felicita Brooks MD     Date of Patient follow up with Physician: 25     POC update due: (10 visits /OR AUTH LIMITS, whichever is less)  25                                            Precautions/ Contra-indications:           Latex allergy:  NO  Pacemaker:    NO  Contraindications for Manipulation: None and NA  Date of Surgery:    Other:    Red Flags:  None    C-SSRS Triggered by Intake questionnaire:   [x] No, Questionnaire did not trigger screening.   [] Yes, Patient intake triggered further evaluation      [] C-SSRS Screening completed  [] PCP notified via Plan of Care  [] Emergency services notified     Preferred Language for Healthcare:   [x] English       [] other:    SUBJECTIVE EXAMINATION     Patient stated complaint: Patient had x-ray of L knee yesterday at  but hasn't heard results yet. Left knee has wanted to buckle several times over the last week. Unaware of any IVETTE but does note it occurs more frequently when not wearing AFO       Test used Initial score  2025   Pain Summary VAS 7/10 4-5/10 LBP   Functional

## 2025-07-03 ENCOUNTER — OFFICE VISIT (OUTPATIENT)
Dept: INTERNAL MEDICINE CLINIC | Age: 45
End: 2025-07-03
Payer: COMMERCIAL

## 2025-07-03 VITALS
SYSTOLIC BLOOD PRESSURE: 98 MMHG | HEART RATE: 94 BPM | WEIGHT: 197 LBS | DIASTOLIC BLOOD PRESSURE: 62 MMHG | OXYGEN SATURATION: 95 % | BODY MASS INDEX: 27.48 KG/M2

## 2025-07-03 DIAGNOSIS — R73.03 PREDIABETES: ICD-10-CM

## 2025-07-03 DIAGNOSIS — F32.89 OTHER DEPRESSION: ICD-10-CM

## 2025-07-03 DIAGNOSIS — G35 MS (MULTIPLE SCLEROSIS) (HCC): ICD-10-CM

## 2025-07-03 DIAGNOSIS — I82.409 RECURRENT DEEP VEIN THROMBOSIS (DVT) (HCC): ICD-10-CM

## 2025-07-03 DIAGNOSIS — I95.89 OTHER SPECIFIED HYPOTENSION: ICD-10-CM

## 2025-07-03 DIAGNOSIS — E55.9 VITAMIN D DEFICIENCY: ICD-10-CM

## 2025-07-03 DIAGNOSIS — F17.210 SMOKES CIGARETTES: ICD-10-CM

## 2025-07-03 DIAGNOSIS — J43.8 OTHER EMPHYSEMA (HCC): ICD-10-CM

## 2025-07-03 DIAGNOSIS — M25.562 ACUTE PAIN OF LEFT KNEE: Primary | ICD-10-CM

## 2025-07-03 PROCEDURE — G8419 CALC BMI OUT NRM PARAM NOF/U: HCPCS | Performed by: INTERNAL MEDICINE

## 2025-07-03 PROCEDURE — 4004F PT TOBACCO SCREEN RCVD TLK: CPT | Performed by: INTERNAL MEDICINE

## 2025-07-03 PROCEDURE — 99214 OFFICE O/P EST MOD 30 MIN: CPT | Performed by: INTERNAL MEDICINE

## 2025-07-03 PROCEDURE — G2211 COMPLEX E/M VISIT ADD ON: HCPCS | Performed by: INTERNAL MEDICINE

## 2025-07-03 PROCEDURE — 3023F SPIROM DOC REV: CPT | Performed by: INTERNAL MEDICINE

## 2025-07-03 PROCEDURE — G8427 DOCREV CUR MEDS BY ELIG CLIN: HCPCS | Performed by: INTERNAL MEDICINE

## 2025-07-03 RX ORDER — LIDOCAINE 50 MG/G
1 PATCH TOPICAL DAILY PRN
Qty: 30 PATCH | Refills: 0 | Status: SHIPPED | OUTPATIENT
Start: 2025-07-03

## 2025-07-03 NOTE — PATIENT INSTRUCTIONS
TAKE MED AS ADVISED    DIET/ EXERCISE.    FOLLOW UP PREVIOUS / AS NEEDED    FOLLOW UP FOR LABS, MRI    [facility] Locations - No appointment necessary.  ? indicates the location is open Saturdays in addition to Monday through Friday.   Call your preferred location for test preparation, business hours and other information you need.   [facility] accepts all insurances.  [place]  [place]    ? [place]   [address]   [address]   [place] [address]    Ph: [phone]  [facility]   [address] [place] [address]    Ph: [phone]   ? [place]   [address]    [place] [address]    Ph: [phone]     [facility]   [address] [place] [address]    Ph: [phone] ? [place]   [address] [place] [address]   Ph: [phone]  ? [facility]   [address] [place] [address]    Ph: [phone]      [place]   [address] [place] [address]   Ph: [phone]     [facility]  [address] [place] [address]   Ph: [phone]    [place]    ? [facility]   [address] [place] [address]    Ph: [phone]  [facility]   [address] [place] [address]   Ph: [phone]  [place]   [address]   [facility], [address]    PH: [phone]    [facility]   [address] [place] [address]    Ph: [phone]  [place]  [address] [place] [address]  Ph: [phone]  [facility]   [address] [place] [address]    Ph: [phone]

## 2025-07-03 NOTE — PROGRESS NOTES
10 MG tablet Take 1 tablet by mouth 2 times daily at 0800 and 1400 1 IN THE MORNING, 3 AT NIGHT Yes Bertha Perez MD   traZODone (DESYREL) 100 MG tablet Take 1 tablet by mouth nightly as needed for Sleep Yes Felicita Brooks MD   carbamide peroxide (DEBROX) 6.5 % otic solution Place 5 drops into both ears 2 times daily Yes Felicita Brooks MD   Multiple Vitamins-Minerals (THERAPEUTIC MULTIVITAMIN-MINERALS) tablet Take 1 tablet by mouth daily Yes Felicita Brooks MD   nicotine (NICODERM CQ) 14 MG/24HR Place 1 patch onto the skin daily Yes Felicita Brooks MD   famotidine (PEPCID) 20 MG tablet Take 1 tablet by mouth 2 times daily Yes Felicita Brooks MD   pregabalin (LYRICA) 75 MG capsule  Yes Bertha Perez MD   INCRUSE ELLIPTA 62.5 MCG/ACT inhaler  Yes Bertha Perez MD   umeclidinium bromide (INCRUSE ELLIPTA) 62.5 MCG/ACT inhaler 1 puff Yes ProviderBertha MD   diclofenac sodium (VOLTAREN) 1 % GEL Apply 4 g topically 4 times daily as needed for Pain Yes Felicita Brooks MD     ROS: COMPREHENSIVE ROS AS IN HX, REST -VE  History obtained from chart review and the patient       OBJECTIVE:   NURSING NOTE AND VITALS REVIEWED  BP 91/63   Pulse 94   Wt 89.4 kg (197 lb)   SpO2 95%   BMI 27.48 kg/m²     NO ACUTE DISTRESS  + NICOTINE BREATH    REPEAT BP:  98/62 (RT)    Body mass index is 27.48 kg/m².      HEENT: NO PALLOR, ANICTERIC, PERRLA, EOMI, NO CONJUNCTIVAL ERYTHEMA,                 NO SINUS TENDERNESS  NECK:  SUPPLE, TRACHEA MIDLINE, NT, NO JVD, NO CB, NO LA, NO TM, NO STIFFNESS  CHEST: RESPY EFFORT NL, GOOD AE, NO W/R/C  HEART: S1S2+ REG, NO M/G/R  ABD: SOFT, NT, NO HSM, BS+  EXT: TRACE EDEMA, ? NT, PULSES +. SLOANE'S -VE  NEURO: ALERT AND ORIENTED X 3, NO MENINGEAL SIGNS, NO TREMORS, AMBULATING IN A POWER CHAIR OTHERWISE, NO NEW FOCAL DEFICIT   PSYCH: FAIRLY GOOD AFFECT  BACK: NT, NO ROM, NO CVA TENDERNESS  KNEE: + FULLNESS, + TENDERNESS,, + PAIN WITH MVT - LT, + ROM -

## 2025-07-08 ENCOUNTER — TELEPHONE (OUTPATIENT)
Dept: ADMINISTRATIVE | Age: 45
End: 2025-07-08

## 2025-07-11 ENCOUNTER — HOSPITAL ENCOUNTER (OUTPATIENT)
Dept: PHYSICAL THERAPY | Age: 45
Setting detail: THERAPIES SERIES
Discharge: HOME | End: 2025-07-11
Payer: COMMERCIAL

## 2025-07-11 PROCEDURE — 97110 THERAPEUTIC EXERCISES: CPT | Performed by: PHYSICAL THERAPIST

## 2025-07-11 PROCEDURE — 97530 THERAPEUTIC ACTIVITIES: CPT | Performed by: PHYSICAL THERAPIST

## 2025-07-11 NOTE — FLOWSHEET NOTE
[facility]- Outpatient Rehabilitation and Therapy [address], [place] [address] office: [phone] fax: [phone]      Physical Therapy: TREATMENT/PROGRESS NOTE   Patient: Juliocesar Freire (44 y.o. male)   Examination Date: 2025   :  1980 MRN: [id number]   Visit #: 50 (2025)  Insurance Allowable Auth Needed        30   Caresource []Yes    []No    Insurance: Payor: CARESOURCE / Plan: [employer] [place] MEDICAID / Product Type: *No Product type* /   Insurance ID: [id number] - (Medicaid Managed)  Secondary Insurance (if applicable):    Treatment Diagnosis:    1. Increased pain  R52         2. Foot drop, left foot  M21.372         3. Gait abnormality  R26.9         4. Decreased strength  R53.1              Medical Diagnosis: Multiple Sclerosis  Radiculopathy, lumbosacral region [M54.17]   Referring Physician: Felicita Brooks MD  PCP: Felicita Brooks MD     Date of Patient follow up with Physician: 25     POC update due: (10 visits /OR AUTH LIMITS, whichever is less)  25                                            Precautions/ Contra-indications:           Latex allergy:  NO  Pacemaker:    NO  Contraindications for Manipulation: None and NA  Date of Surgery:    Other:    Red Flags:  None    C-SSRS Triggered by Intake questionnaire:   [x] No, Questionnaire did not trigger screening.   [] Yes, Patient intake triggered further evaluation      [] C-SSRS Screening completed  [] PCP notified via Plan of Care  [] Emergency services notified     Preferred Language for Healthcare:   [x] English       [] other:    SUBJECTIVE EXAMINATION     Patient stated complaint: Patient has a MRI for his left knee scheduled for Monday. Lateral fat pad area, giving way, pain. No significant change in LBP. Heat affects M.S.        Test used Initial score  2025   Pain Summary VAS 7/10    Functional questionnaire Modified Oswestry 62%    Strength Hip flexion  3/5    Hip

## 2025-07-14 ENCOUNTER — HOSPITAL ENCOUNTER (OUTPATIENT)
Dept: MRI IMAGING | Age: 45
Discharge: HOME | End: 2025-07-14
Payer: COMMERCIAL

## 2025-07-14 DIAGNOSIS — M25.562 ACUTE PAIN OF LEFT KNEE: ICD-10-CM

## 2025-07-14 PROCEDURE — 73721 MRI JNT OF LWR EXTRE W/O DYE: CPT

## 2025-07-17 ENCOUNTER — TELEPHONE (OUTPATIENT)
Dept: INTERNAL MEDICINE CLINIC | Age: 45
End: 2025-07-17

## 2025-07-17 DIAGNOSIS — R93.6 ABNORMAL MRI, KNEE: ICD-10-CM

## 2025-07-17 DIAGNOSIS — M25.562 ACUTE PAIN OF LEFT KNEE: Primary | ICD-10-CM

## 2025-07-17 NOTE — TELEPHONE ENCOUNTER
IMPRESSION:     1.  Complex lateral meniscus tear.  Medial meniscus tear.  2.  Chronic ACL and MCL low-grade sprains.  3.  Lateral compartment recent subchondral fracture with superimposed severe osteoarthritis with grade IV chondromalacia.  Edematous anterior Hoffa's fat pad and IT band insertion.  4.  Tricompartmental osteoarthritis, grade 3/4 chondromalacia.  Large joint effusion with synovitis.  Soft tissue edema.    ABN MRI CALLED AND D/W PT  LT KNEE PAIN  REFER ORTHO

## 2025-07-25 ENCOUNTER — HOSPITAL ENCOUNTER (OUTPATIENT)
Dept: PHYSICAL THERAPY | Age: 45
Setting detail: THERAPIES SERIES
Discharge: HOME | End: 2025-07-25
Payer: COMMERCIAL

## 2025-07-25 PROCEDURE — 97530 THERAPEUTIC ACTIVITIES: CPT | Performed by: PHYSICAL THERAPIST

## 2025-07-25 PROCEDURE — 97110 THERAPEUTIC EXERCISES: CPT | Performed by: PHYSICAL THERAPIST

## 2025-07-25 NOTE — PLAN OF CARE
[facility]- Outpatient Rehabilitation and Therapy [address], [place] [address] office: [phone] fax: [phone]  Physical Therapy Re-Certification Plan of Care    Dear Felicita Brooks MD  ,    We had the pleasure of treating the following patient for physical therapy services at [facility]. A summary of our findings can be found in the updated assessment below.  This includes our plan of care.  If you have any questions or concerns regarding these findings, please do not hesitate to contact me at the office phone number checked above.  Thank you for the referral.     Physician Signature:________________________________Date:__________________  By signing above (or electronic signature), therapist's plan is approved by physician      Total Visits: 51     Overall Response to Treatment:  Initiated program for left knee. Pt will see the ortho MD in 2 weeks. [place]rin[place] to work on total body strengthening and functional mobility for M.S.    Recommendation:    [x] Continue PT 1x / wk for 6 weeks.   [] Hold PT, pending MD visit   [] Discharge to [facility]. Follow up with PT or MD PRN.      Physical Therapy: TREATMENT/PROGRESS NOTE   Patient: Juliocesar Freire (44 y.o. male)   Examination Date: 2025   :  1980 MRN: [id number]   Visit #: 51 (2025)  Insurance Allowable Auth Needed        30   Caresource []Yes    []No    Insurance: Payor: [employer]ESOURCE / Plan: [employer] [place] MEDICAID / Product Type: *No Product type* /   Insurance ID: [id number] - (Medicaid Managed)  Secondary Insurance (if applicable):    Treatment Diagnosis:    1. Increased pain  R52         2. Foot drop, left foot  M21.372         3. Gait abnormality  R26.9         4. Decreased strength  R53.1              Medical Diagnosis: Multiple Sclerosis; Left knee MMT/LMT/severe OA  Radiculopathy, lumbosacral region [M54.17]   Referring Physician: Felicita Brooks MD  PCP: Felicita Brooks MD

## 2025-08-01 ENCOUNTER — HOSPITAL ENCOUNTER (OUTPATIENT)
Dept: PHYSICAL THERAPY | Age: 45
Setting detail: THERAPIES SERIES
Discharge: HOME | End: 2025-08-01
Payer: COMMERCIAL

## 2025-08-01 PROCEDURE — 97530 THERAPEUTIC ACTIVITIES: CPT | Performed by: PHYSICAL THERAPIST

## 2025-08-01 PROCEDURE — 97110 THERAPEUTIC EXERCISES: CPT | Performed by: PHYSICAL THERAPIST

## 2025-08-01 NOTE — FLOWSHEET NOTE
postural re-education, activity modification, progression of HEP.        [] Aquatic Therapy    Plan: ROM, stretching, strengthening, gait training, balance.    Electronically Signed by Alma Medina, PT  Date: 08/01/2025  Note: Portions of this note have been templated and/or copied from initial evaluation, reassessments and prior notes for documentation efficiency.

## 2025-08-08 ENCOUNTER — HOSPITAL ENCOUNTER (OUTPATIENT)
Dept: PHYSICAL THERAPY | Age: 45
Setting detail: THERAPIES SERIES
Discharge: HOME | End: 2025-08-08
Payer: MEDICAID

## 2025-08-08 PROCEDURE — 97530 THERAPEUTIC ACTIVITIES: CPT

## 2025-08-08 PROCEDURE — 97110 THERAPEUTIC EXERCISES: CPT

## 2025-08-15 ENCOUNTER — HOSPITAL ENCOUNTER (OUTPATIENT)
Dept: PHYSICAL THERAPY | Age: 45
Setting detail: THERAPIES SERIES
Discharge: HOME | End: 2025-08-15
Payer: MEDICAID

## 2025-08-15 PROCEDURE — 97110 THERAPEUTIC EXERCISES: CPT | Performed by: PHYSICAL THERAPIST

## 2025-08-15 PROCEDURE — 97530 THERAPEUTIC ACTIVITIES: CPT | Performed by: PHYSICAL THERAPIST

## 2025-08-21 ENCOUNTER — OFFICE VISIT (OUTPATIENT)
Dept: ORTHOPEDIC SURGERY | Age: 45
End: 2025-08-21

## 2025-08-21 VITALS — HEIGHT: 71 IN | WEIGHT: 195 LBS | BODY MASS INDEX: 27.3 KG/M2

## 2025-08-21 DIAGNOSIS — M25.562 LEFT KNEE PAIN, UNSPECIFIED CHRONICITY: ICD-10-CM

## 2025-08-21 DIAGNOSIS — M17.12 PRIMARY OSTEOARTHRITIS OF LEFT KNEE: Primary | ICD-10-CM

## 2025-08-21 RX ORDER — METHYLPREDNISOLONE ACETATE 40 MG/ML
80 INJECTION, SUSPENSION INTRA-ARTICULAR; INTRALESIONAL; INTRAMUSCULAR; SOFT TISSUE ONCE
Status: COMPLETED | OUTPATIENT
Start: 2025-08-21 | End: 2025-08-21

## 2025-08-21 RX ORDER — LIDOCAINE HYDROCHLORIDE 10 MG/ML
2 INJECTION, SOLUTION INFILTRATION; PERINEURAL ONCE
Status: COMPLETED | OUTPATIENT
Start: 2025-08-21 | End: 2025-08-21

## 2025-08-21 RX ORDER — ROPIVACAINE HYDROCHLORIDE 5 MG/ML
4 INJECTION, SOLUTION EPIDURAL; INFILTRATION; PERINEURAL ONCE
Status: COMPLETED | OUTPATIENT
Start: 2025-08-21 | End: 2025-08-21

## 2025-08-21 RX ADMIN — ROPIVACAINE HYDROCHLORIDE 4 ML: 5 INJECTION, SOLUTION EPIDURAL; INFILTRATION; PERINEURAL at 11:32

## 2025-08-21 RX ADMIN — METHYLPREDNISOLONE ACETATE 80 MG: 40 INJECTION, SUSPENSION INTRA-ARTICULAR; INTRALESIONAL; INTRAMUSCULAR; SOFT TISSUE at 11:32

## 2025-08-21 RX ADMIN — LIDOCAINE HYDROCHLORIDE 2 ML: 10 INJECTION, SOLUTION INFILTRATION; PERINEURAL at 11:32

## 2025-08-22 ENCOUNTER — HOSPITAL ENCOUNTER (OUTPATIENT)
Dept: PHYSICAL THERAPY | Age: 45
Setting detail: THERAPIES SERIES
Discharge: HOME | End: 2025-08-22
Payer: COMMERCIAL

## 2025-08-22 PROCEDURE — 97110 THERAPEUTIC EXERCISES: CPT | Performed by: PHYSICAL THERAPIST

## 2025-08-22 PROCEDURE — 97530 THERAPEUTIC ACTIVITIES: CPT | Performed by: PHYSICAL THERAPIST

## 2025-08-26 ENCOUNTER — OFFICE VISIT (OUTPATIENT)
Dept: INTERNAL MEDICINE CLINIC | Age: 45
End: 2025-08-26
Payer: COMMERCIAL

## 2025-08-26 VITALS
BODY MASS INDEX: 26.74 KG/M2 | HEART RATE: 72 BPM | OXYGEN SATURATION: 98 % | SYSTOLIC BLOOD PRESSURE: 110 MMHG | WEIGHT: 191 LBS | HEIGHT: 71 IN | TEMPERATURE: 97.8 F | DIASTOLIC BLOOD PRESSURE: 78 MMHG

## 2025-08-26 DIAGNOSIS — I82.409 RECURRENT DEEP VEIN THROMBOSIS (DVT) (HCC): Primary | ICD-10-CM

## 2025-08-26 DIAGNOSIS — E55.9 VITAMIN D DEFICIENCY: ICD-10-CM

## 2025-08-26 DIAGNOSIS — G35 MS (MULTIPLE SCLEROSIS) (HCC): ICD-10-CM

## 2025-08-26 DIAGNOSIS — M17.12 PRIMARY OSTEOARTHRITIS OF LEFT KNEE: ICD-10-CM

## 2025-08-26 DIAGNOSIS — F32.89 OTHER DEPRESSION: ICD-10-CM

## 2025-08-26 DIAGNOSIS — F17.210 SMOKES CIGARETTES: ICD-10-CM

## 2025-08-26 DIAGNOSIS — J43.8 OTHER EMPHYSEMA (HCC): ICD-10-CM

## 2025-08-26 DIAGNOSIS — R73.03 PREDIABETES: ICD-10-CM

## 2025-08-26 PROCEDURE — 4004F PT TOBACCO SCREEN RCVD TLK: CPT | Performed by: INTERNAL MEDICINE

## 2025-08-26 PROCEDURE — G8427 DOCREV CUR MEDS BY ELIG CLIN: HCPCS | Performed by: INTERNAL MEDICINE

## 2025-08-26 PROCEDURE — G2211 COMPLEX E/M VISIT ADD ON: HCPCS | Performed by: INTERNAL MEDICINE

## 2025-08-26 PROCEDURE — 99214 OFFICE O/P EST MOD 30 MIN: CPT | Performed by: INTERNAL MEDICINE

## 2025-08-26 PROCEDURE — G8419 CALC BMI OUT NRM PARAM NOF/U: HCPCS | Performed by: INTERNAL MEDICINE

## 2025-08-26 PROCEDURE — 3023F SPIROM DOC REV: CPT | Performed by: INTERNAL MEDICINE

## 2025-08-26 RX ORDER — TIOTROPIUM BROMIDE 18 UG/1
18 CAPSULE ORAL; RESPIRATORY (INHALATION) DAILY
Qty: 30 CAPSULE | Refills: 3 | Status: SHIPPED | OUTPATIENT
Start: 2025-08-26

## 2025-08-26 SDOH — ECONOMIC STABILITY: FOOD INSECURITY: WITHIN THE PAST 12 MONTHS, THE FOOD YOU BOUGHT JUST DIDN'T LAST AND YOU DIDN'T HAVE MONEY TO GET MORE.: NEVER TRUE

## 2025-08-26 SDOH — ECONOMIC STABILITY: FOOD INSECURITY: WITHIN THE PAST 12 MONTHS, YOU WORRIED THAT YOUR FOOD WOULD RUN OUT BEFORE YOU GOT MONEY TO BUY MORE.: NEVER TRUE

## 2025-08-26 ASSESSMENT — PATIENT HEALTH QUESTIONNAIRE - PHQ9
SUM OF ALL RESPONSES TO PHQ QUESTIONS 1-9: 0
2. FEELING DOWN, DEPRESSED OR HOPELESS: NOT AT ALL
1. LITTLE INTEREST OR PLEASURE IN DOING THINGS: NOT AT ALL
SUM OF ALL RESPONSES TO PHQ QUESTIONS 1-9: 0

## 2025-08-29 ENCOUNTER — HOSPITAL ENCOUNTER (OUTPATIENT)
Dept: PHYSICAL THERAPY | Age: 45
Setting detail: THERAPIES SERIES
Discharge: HOME | End: 2025-08-29
Payer: COMMERCIAL

## 2025-08-29 PROCEDURE — 97110 THERAPEUTIC EXERCISES: CPT | Performed by: PHYSICAL THERAPIST

## 2025-08-29 PROCEDURE — 97530 THERAPEUTIC ACTIVITIES: CPT | Performed by: PHYSICAL THERAPIST

## 2025-09-05 ENCOUNTER — HOSPITAL ENCOUNTER (OUTPATIENT)
Dept: PHYSICAL THERAPY | Age: 45
Setting detail: THERAPIES SERIES
Discharge: HOME | End: 2025-09-05
Payer: COMMERCIAL

## 2025-09-05 PROCEDURE — 97530 THERAPEUTIC ACTIVITIES: CPT | Performed by: PHYSICAL THERAPIST

## 2025-09-05 PROCEDURE — 97110 THERAPEUTIC EXERCISES: CPT | Performed by: PHYSICAL THERAPIST
